# Patient Record
Sex: FEMALE | Race: BLACK OR AFRICAN AMERICAN | NOT HISPANIC OR LATINO | Employment: FULL TIME | URBAN - METROPOLITAN AREA
[De-identification: names, ages, dates, MRNs, and addresses within clinical notes are randomized per-mention and may not be internally consistent; named-entity substitution may affect disease eponyms.]

---

## 2019-01-22 ENCOUNTER — TELEPHONE (OUTPATIENT)
Dept: OBGYN CLINIC | Facility: CLINIC | Age: 39
End: 2019-01-22

## 2019-01-29 ENCOUNTER — TELEPHONE (OUTPATIENT)
Dept: OBGYN CLINIC | Facility: CLINIC | Age: 39
End: 2019-01-29

## 2019-01-29 NOTE — TELEPHONE ENCOUNTER
Patient calling in about her bp med and newly pregnant  PCP wants to switch meds  Currently taking toporal 50mg  Per Dr Sarah Guerrier - Let her know metoprolol is safe to take till we see her but ask her to record blood pressures daily at different times to have information to decide, meantime message [facility] and ask if they would recommend conversion to labetalol or if they want to see her to do it and discuss  Pt is aware and will reach out to [facility] as well

## 2019-02-04 PROBLEM — Z34.81 MULTIGRAVIDA IN FIRST TRIMESTER: Status: ACTIVE | Noted: 2019-02-04

## 2019-02-05 ENCOUNTER — HOSPITAL ENCOUNTER (OUTPATIENT)
Dept: ULTRASOUND IMAGING | Facility: HOSPITAL | Age: 39
Discharge: HOME | End: 2019-02-05
Payer: COMMERCIAL

## 2019-02-05 ENCOUNTER — INITIAL PRENATAL (OUTPATIENT)
Dept: OBGYN CLINIC | Facility: CLINIC | Age: 39
End: 2019-02-05
Payer: COMMERCIAL

## 2019-02-05 VITALS
HEIGHT: 64 IN | SYSTOLIC BLOOD PRESSURE: 128 MMHG | BODY MASS INDEX: 27.83 KG/M2 | WEIGHT: 163 LBS | DIASTOLIC BLOOD PRESSURE: 80 MMHG

## 2019-02-05 DIAGNOSIS — Z34.81 MULTIGRAVIDA IN FIRST TRIMESTER: Primary | ICD-10-CM

## 2019-02-05 DIAGNOSIS — Z34.81 MULTIGRAVIDA IN FIRST TRIMESTER: ICD-10-CM

## 2019-02-05 PROCEDURE — 99213 OFFICE O/P EST LOW 20 MIN: CPT | Performed by: PHYSICIAN ASSISTANT

## 2019-02-05 PROCEDURE — 76801 OB US < 14 WKS SINGLE FETUS: CPT

## 2019-02-05 RX ORDER — METOPROLOL TARTRATE 100 MG/1
50 TABLET ORAL
COMMUNITY
End: 2019-04-17

## 2019-02-05 RX ORDER — OMEPRAZOLE 20 MG/1
20 CAPSULE, DELAYED RELEASE ORAL
COMMUNITY
Start: 2018-03-12 | End: 2021-01-07

## 2019-02-05 NOTE — PROGRESS NOTES
Pt for NOB, pap and cx's done  AMA, unplanned pregnancy but pt has support from the FOB  She is nervous as she was much younger when she had her last pregnancies  She now has fibroids and HTN   TVUS + GS noted measuring c/w 6w2d no YS noted no CRL seen but difficult scan due to multiple fibroids in the uterus     Will plan [facility] consult

## 2019-02-06 ENCOUNTER — TELEPHONE (OUTPATIENT)
Dept: OBGYN CLINIC | Facility: CLINIC | Age: 39
End: 2019-02-06

## 2019-02-12 LAB
C TRACH RRNA SPEC QL NAA+PROBE: NOT DETECTED
CLINICAL INFO: NORMAL
CYTO CVX: NORMAL
CYTOLOGY CMNT CVX/VAG CYTO-IMP: NORMAL
DATE PREVIOUS BX: NORMAL
HPV E6+E7 MRNA CVX QL NAA+PROBE: NOT DETECTED
LMP START DATE: NORMAL
N GONORRHOEA RRNA SPEC QL NAA+PROBE: NOT DETECTED
QUESTION/PROBLEM: NORMAL
SL AMB CONTAINER TYPE: NORMAL
SL AMB FINAL RESOLUTION: NORMAL
SL AMB PREV. PAP:: NORMAL
SL AMB REPORT STATUS: NORMAL
SPECIMEN SOURCE CVX/VAG CYTO: NORMAL
T VAGINALIS RRNA SPEC QL NAA+PROBE: NOT DETECTED

## 2019-02-13 ENCOUNTER — TELEPHONE (OUTPATIENT)
Dept: OBGYN CLINIC | Facility: CLINIC | Age: 39
End: 2019-02-13

## 2019-02-15 ENCOUNTER — TELEPHONE (OUTPATIENT)
Dept: OBGYN CLINIC | Facility: CLINIC | Age: 39
End: 2019-02-15

## 2019-02-18 LAB
ABO GROUP BLD: NORMAL
B-HCG SERPL-ACNC: ABNORMAL MIU/ML
BASOPHILS # BLD AUTO: 32 CELLS/UL (ref 0–200)
BASOPHILS NFR BLD AUTO: 0.8 %
BLD GP AB SCN SERPL QL: NORMAL
EOSINOPHIL # BLD AUTO: 120 CELLS/UL (ref 15–500)
EOSINOPHIL NFR BLD AUTO: 3 %
ERYTHROCYTE [DISTWIDTH] IN BLOOD BY AUTOMATED COUNT: 15.3 % (ref 11–15)
HCT VFR BLD AUTO: 29.8 % (ref 35–45)
HGB BLD-MCNC: 9.3 G/DL (ref 11.7–15.5)
LYMPHOCYTES # BLD AUTO: 1444 CELLS/UL (ref 850–3900)
LYMPHOCYTES NFR BLD AUTO: 36.1 %
MCH RBC QN AUTO: 27.3 PG (ref 27–33)
MCHC RBC AUTO-ENTMCNC: 31.2 G/DL (ref 32–36)
MCV RBC AUTO: 87.4 FL (ref 80–100)
MONOCYTES # BLD AUTO: 472 CELLS/UL (ref 200–950)
MONOCYTES NFR BLD AUTO: 11.8 %
NEUTROPHILS # BLD AUTO: 1932 CELLS/UL (ref 1500–7800)
NEUTROPHILS NFR BLD AUTO: 48.3 %
PLATELET # BLD AUTO: 326 THOUSAND/UL (ref 140–400)
PMV BLD REES-ECKER: 9.9 FL (ref 7.5–12.5)
RBC # BLD AUTO: 3.41 MILLION/UL (ref 3.8–5.1)
RH BLD: NORMAL
WBC # BLD AUTO: 4 THOUSAND/UL (ref 3.8–10.8)

## 2019-02-19 ENCOUNTER — TELEPHONE (OUTPATIENT)
Dept: OBGYN CLINIC | Facility: CLINIC | Age: 39
End: 2019-02-19

## 2019-02-19 DIAGNOSIS — D64.9 ANEMIA, UNSPECIFIED TYPE: Primary | ICD-10-CM

## 2019-02-19 DIAGNOSIS — O20.9 VAGINAL BLEEDING AFFECTING EARLY PREGNANCY: ICD-10-CM

## 2019-02-19 DIAGNOSIS — Z3A.10 10 WEEKS GESTATION OF PREGNANCY: Primary | ICD-10-CM

## 2019-02-20 ENCOUNTER — TRANSCRIBE ORDERS (OUTPATIENT)
Dept: ADMINISTRATIVE | Age: 39
End: 2019-02-20

## 2019-02-20 ENCOUNTER — APPOINTMENT (OUTPATIENT)
Dept: LAB | Age: 39
End: 2019-02-20
Payer: COMMERCIAL

## 2019-02-20 ENCOUNTER — HOSPITAL ENCOUNTER (OUTPATIENT)
Dept: RADIOLOGY | Age: 39
Discharge: HOME | End: 2019-02-20
Payer: COMMERCIAL

## 2019-02-20 DIAGNOSIS — D64.9 ANEMIA, UNSPECIFIED TYPE: Primary | ICD-10-CM

## 2019-02-20 DIAGNOSIS — Z3A.10 10 WEEKS GESTATION OF PREGNANCY: ICD-10-CM

## 2019-02-20 DIAGNOSIS — O20.9 VAGINAL BLEEDING AFFECTING EARLY PREGNANCY: ICD-10-CM

## 2019-02-20 DIAGNOSIS — D64.9 ANEMIA, UNSPECIFIED TYPE: ICD-10-CM

## 2019-02-20 LAB
ALBUMIN SERPL BCP-MCNC: 3.4 G/DL (ref 3.5–5)
ALP SERPL-CCNC: 50 U/L (ref 46–116)
ALT SERPL W P-5'-P-CCNC: 13 U/L (ref 12–78)
ANION GAP SERPL CALCULATED.3IONS-SCNC: 7 MMOL/L (ref 4–13)
AST SERPL W P-5'-P-CCNC: 11 U/L (ref 5–45)
B-HCG SERPL-ACNC: 8943 MIU/ML
BASOPHILS # BLD AUTO: 0.03 THOUSANDS/ΜL (ref 0–0.1)
BASOPHILS NFR BLD AUTO: 1 % (ref 0–1)
BILIRUB SERPL-MCNC: 0.32 MG/DL (ref 0.2–1)
BUN SERPL-MCNC: 5 MG/DL (ref 5–25)
CALCIUM SERPL-MCNC: 8.6 MG/DL (ref 8.3–10.1)
CHLORIDE SERPL-SCNC: 106 MMOL/L (ref 100–108)
CO2 SERPL-SCNC: 26 MMOL/L (ref 21–32)
CREAT SERPL-MCNC: 0.74 MG/DL (ref 0.6–1.3)
EOSINOPHIL # BLD AUTO: 0.06 THOUSAND/ΜL (ref 0–0.61)
EOSINOPHIL NFR BLD AUTO: 2 % (ref 0–6)
ERYTHROCYTE [DISTWIDTH] IN BLOOD BY AUTOMATED COUNT: 16.7 % (ref 11.6–15.1)
FERRITIN SERPL-MCNC: 6 NG/ML (ref 8–388)
GFR SERPL CREATININE-BSD FRML MDRD: 120 ML/MIN/1.73SQ M
GLUCOSE SERPL-MCNC: 83 MG/DL (ref 65–140)
HCT VFR BLD AUTO: 33 % (ref 34.8–46.1)
HGB BLD-MCNC: 10 G/DL (ref 11.5–15.4)
IMM GRANULOCYTES # BLD AUTO: 0.01 THOUSAND/UL (ref 0–0.2)
IMM GRANULOCYTES NFR BLD AUTO: 0 % (ref 0–2)
IRON SERPL-MCNC: 75 UG/DL (ref 50–170)
LYMPHOCYTES # BLD AUTO: 1.23 THOUSANDS/ΜL (ref 0.6–4.47)
LYMPHOCYTES NFR BLD AUTO: 33 % (ref 14–44)
MCH RBC QN AUTO: 27.3 PG (ref 26.8–34.3)
MCHC RBC AUTO-ENTMCNC: 30.3 G/DL (ref 31.4–37.4)
MCV RBC AUTO: 90 FL (ref 82–98)
MONOCYTES # BLD AUTO: 0.31 THOUSAND/ΜL (ref 0.17–1.22)
MONOCYTES NFR BLD AUTO: 8 % (ref 4–12)
NEUTROPHILS # BLD AUTO: 2.1 THOUSANDS/ΜL (ref 1.85–7.62)
NEUTS SEG NFR BLD AUTO: 56 % (ref 43–75)
NRBC BLD AUTO-RTO: 0 /100 WBCS
PLATELET # BLD AUTO: 346 THOUSANDS/UL (ref 149–390)
PMV BLD AUTO: 10 FL (ref 8.9–12.7)
POTASSIUM SERPL-SCNC: 3.6 MMOL/L (ref 3.5–5.3)
PROT SERPL-MCNC: 7.8 G/DL (ref 6.4–8.2)
RBC # BLD AUTO: 3.66 MILLION/UL (ref 3.81–5.12)
SODIUM SERPL-SCNC: 139 MMOL/L (ref 136–145)
TIBC SERPL-MCNC: 418 UG/DL (ref 250–450)
WBC # BLD AUTO: 3.74 THOUSAND/UL (ref 4.31–10.16)

## 2019-02-20 PROCEDURE — 84702 CHORIONIC GONADOTROPIN TEST: CPT

## 2019-02-20 PROCEDURE — 85025 COMPLETE CBC W/AUTO DIFF WBC: CPT

## 2019-02-20 PROCEDURE — 82728 ASSAY OF FERRITIN: CPT

## 2019-02-20 PROCEDURE — 36415 COLL VENOUS BLD VENIPUNCTURE: CPT

## 2019-02-20 PROCEDURE — 76815 OB US LIMITED FETUS(S): CPT

## 2019-02-20 PROCEDURE — 83540 ASSAY OF IRON: CPT

## 2019-02-20 PROCEDURE — 83020 HEMOGLOBIN ELECTROPHORESIS: CPT

## 2019-02-20 PROCEDURE — 80053 COMPREHEN METABOLIC PANEL: CPT

## 2019-02-20 PROCEDURE — 83550 IRON BINDING TEST: CPT

## 2019-02-21 ENCOUNTER — TELEPHONE (OUTPATIENT)
Dept: OBGYN CLINIC | Facility: CLINIC | Age: 39
End: 2019-02-21

## 2019-02-21 DIAGNOSIS — O03.9 SPONTANEOUS MISCARRIAGE: Primary | ICD-10-CM

## 2019-02-21 NOTE — TELEPHONE ENCOUNTER
Pt called in bleeding  Reviewed with patient hcg level consistent with a loss  Patient will repeat labs 1-2 weeks depending on how bleeding progresses  All questions answered for patient and will call back if has any additional questions or concerns to call back  Otherwise to call once bw completed

## 2019-02-22 ENCOUNTER — APPOINTMENT (EMERGENCY)
Dept: RADIOLOGY | Facility: HOSPITAL | Age: 39
End: 2019-02-22
Payer: COMMERCIAL

## 2019-02-22 ENCOUNTER — HOSPITAL ENCOUNTER (EMERGENCY)
Facility: HOSPITAL | Age: 39
Discharge: HOME | End: 2019-02-22
Attending: EMERGENCY MEDICINE | Admitting: EMERGENCY MEDICINE
Payer: COMMERCIAL

## 2019-02-22 VITALS
DIASTOLIC BLOOD PRESSURE: 74 MMHG | TEMPERATURE: 97.5 F | BODY MASS INDEX: 27.66 KG/M2 | HEART RATE: 60 BPM | SYSTOLIC BLOOD PRESSURE: 140 MMHG | WEIGHT: 162 LBS | HEIGHT: 64 IN | OXYGEN SATURATION: 100 % | RESPIRATION RATE: 18 BRPM

## 2019-02-22 DIAGNOSIS — O03.9 SPONTANEOUS MISCARRIAGE: Primary | ICD-10-CM

## 2019-02-22 LAB
ABO GROUP BLD: NORMAL
ALBUMIN SERPL BCP-MCNC: 3.4 G/DL (ref 3.5–5)
ALP SERPL-CCNC: 52 U/L (ref 46–116)
ALT SERPL W P-5'-P-CCNC: 11 U/L (ref 12–78)
ANION GAP SERPL CALCULATED.3IONS-SCNC: 6 MMOL/L (ref 4–13)
AST SERPL W P-5'-P-CCNC: 18 U/L (ref 5–45)
B-HCG SERPL-ACNC: 5549 MIU/ML
BACTERIA UR QL AUTO: ABNORMAL /HPF
BASOPHILS # BLD AUTO: 0.04 THOUSANDS/ΜL (ref 0–0.1)
BASOPHILS NFR BLD AUTO: 1 % (ref 0–1)
BILIRUB SERPL-MCNC: 0.21 MG/DL (ref 0.2–1)
BILIRUB UR QL STRIP: NEGATIVE
BLD GP AB SCN SERPL QL: NEGATIVE
BUN SERPL-MCNC: 6 MG/DL (ref 5–25)
CALCIUM SERPL-MCNC: 8.5 MG/DL (ref 8.3–10.1)
CHLORIDE SERPL-SCNC: 108 MMOL/L (ref 100–108)
CLARITY UR: CLEAR
CO2 SERPL-SCNC: 27 MMOL/L (ref 21–32)
COLOR UR: YELLOW
COLOR, POC: YELLOW
CREAT SERPL-MCNC: 0.8 MG/DL (ref 0.6–1.3)
DEPRECATED HGB OTHER BLD-IMP: 0 %
EOSINOPHIL # BLD AUTO: 0.1 THOUSAND/ΜL (ref 0–0.61)
EOSINOPHIL NFR BLD AUTO: 2 % (ref 0–6)
ERYTHROCYTE [DISTWIDTH] IN BLOOD BY AUTOMATED COUNT: 17 % (ref 11.6–15.1)
GFR SERPL CREATININE-BSD FRML MDRD: 109 ML/MIN/1.73SQ M
GLUCOSE SERPL-MCNC: 80 MG/DL (ref 65–140)
GLUCOSE UR STRIP-MCNC: NEGATIVE MG/DL
HCT VFR BLD AUTO: 33.7 % (ref 34.8–46.1)
HGB A MFR BLD: 2.6 % (ref 1.8–3.2)
HGB A MFR BLD: 97.4 % (ref 96.4–98.8)
HGB BLD-MCNC: 10.1 G/DL (ref 11.5–15.4)
HGB C MFR BLD: 0 %
HGB F MFR BLD: 0 % (ref 0–2)
HGB FRACT BLD-IMP: NORMAL
HGB S BLD QL SOLY: NEGATIVE
HGB S MFR BLD: 0 %
HGB UR QL STRIP.AUTO: ABNORMAL
IMM GRANULOCYTES # BLD AUTO: 0.01 THOUSAND/UL (ref 0–0.2)
IMM GRANULOCYTES NFR BLD AUTO: 0 % (ref 0–2)
KETONES UR STRIP-MCNC: ABNORMAL MG/DL
LEUKOCYTE ESTERASE UR QL STRIP: NEGATIVE
LYMPHOCYTES # BLD AUTO: 1.34 THOUSANDS/ΜL (ref 0.6–4.47)
LYMPHOCYTES NFR BLD AUTO: 24 % (ref 14–44)
MCH RBC QN AUTO: 26.8 PG (ref 26.8–34.3)
MCHC RBC AUTO-ENTMCNC: 30 G/DL (ref 31.4–37.4)
MCV RBC AUTO: 89 FL (ref 82–98)
MONOCYTES # BLD AUTO: 0.42 THOUSAND/ΜL (ref 0.17–1.22)
MONOCYTES NFR BLD AUTO: 8 % (ref 4–12)
NEUTROPHILS # BLD AUTO: 3.68 THOUSANDS/ΜL (ref 1.85–7.62)
NEUTS SEG NFR BLD AUTO: 65 % (ref 43–75)
NITRITE UR QL STRIP: NEGATIVE
NON-SQ EPI CELLS URNS QL MICRO: ABNORMAL /HPF
NRBC BLD AUTO-RTO: 0 /100 WBCS
PH UR STRIP.AUTO: 8.5 [PH] (ref 4.5–8)
PLATELET # BLD AUTO: 355 THOUSANDS/UL (ref 149–390)
PMV BLD AUTO: 9.6 FL (ref 8.9–12.7)
POTASSIUM SERPL-SCNC: 3.8 MMOL/L (ref 3.5–5.3)
PROT SERPL-MCNC: 7.9 G/DL (ref 6.4–8.2)
PROT UR STRIP-MCNC: ABNORMAL MG/DL
RBC # BLD AUTO: 3.77 MILLION/UL (ref 3.81–5.12)
RBC #/AREA URNS AUTO: ABNORMAL /HPF
RH BLD: POSITIVE
SODIUM SERPL-SCNC: 141 MMOL/L (ref 136–145)
SP GR UR STRIP.AUTO: 1.02 (ref 1–1.03)
SPECIMEN EXPIRATION DATE: NORMAL
UROBILINOGEN UR QL STRIP.AUTO: 0.2 E.U./DL
WBC # BLD AUTO: 5.59 THOUSAND/UL (ref 4.31–10.16)
WBC #/AREA URNS AUTO: ABNORMAL /HPF

## 2019-02-22 PROCEDURE — 96374 THER/PROPH/DIAG INJ IV PUSH: CPT

## 2019-02-22 PROCEDURE — 81001 URINALYSIS AUTO W/SCOPE: CPT

## 2019-02-22 PROCEDURE — 36415 COLL VENOUS BLD VENIPUNCTURE: CPT | Performed by: EMERGENCY MEDICINE

## 2019-02-22 PROCEDURE — 76817 TRANSVAGINAL US OBSTETRIC: CPT

## 2019-02-22 PROCEDURE — 76816 OB US FOLLOW-UP PER FETUS: CPT

## 2019-02-22 PROCEDURE — 81003 URINALYSIS AUTO W/O SCOPE: CPT

## 2019-02-22 PROCEDURE — 84702 CHORIONIC GONADOTROPIN TEST: CPT | Performed by: EMERGENCY MEDICINE

## 2019-02-22 PROCEDURE — 86850 RBC ANTIBODY SCREEN: CPT

## 2019-02-22 PROCEDURE — 87086 URINE CULTURE/COLONY COUNT: CPT

## 2019-02-22 PROCEDURE — 85025 COMPLETE CBC W/AUTO DIFF WBC: CPT | Performed by: EMERGENCY MEDICINE

## 2019-02-22 PROCEDURE — 86900 BLOOD TYPING SEROLOGIC ABO: CPT

## 2019-02-22 PROCEDURE — 80053 COMPREHEN METABOLIC PANEL: CPT | Performed by: EMERGENCY MEDICINE

## 2019-02-22 PROCEDURE — 99284 EMERGENCY DEPT VISIT MOD MDM: CPT

## 2019-02-22 PROCEDURE — 86901 BLOOD TYPING SEROLOGIC RH(D): CPT

## 2019-02-22 RX ORDER — NAPROXEN 500 MG/1
500 TABLET ORAL 2 TIMES DAILY WITH MEALS
Qty: 30 TABLET | Refills: 0 | Status: SHIPPED | OUTPATIENT
Start: 2019-02-22 | End: 2021-01-07

## 2019-02-22 RX ORDER — KETOROLAC TROMETHAMINE 30 MG/ML
15 INJECTION, SOLUTION INTRAMUSCULAR; INTRAVENOUS ONCE
Status: COMPLETED | OUTPATIENT
Start: 2019-02-22 | End: 2019-02-22

## 2019-02-22 RX ADMIN — KETOROLAC TROMETHAMINE 15 MG: 30 INJECTION, SOLUTION INTRAMUSCULAR at 17:09

## 2019-02-22 NOTE — ED ATTENDING ATTESTATION
I, Stef Rouse DO, saw and evaluated the patient  I have discussed the patient with the resident/non-physician practitioner and agree with the resident's/non-physician practitioner's findings, Plan of Care, and MDM as documented in the resident's/non-physician practitioner's note, except where noted  All available labs and Radiology studies were reviewed  At this point I agree with the current assessment done in the Emergency Department  I have conducted an independent evaluation of this patient a history and physical is as follows:      Critical Care Time  Procedures     41 yr old fem to the ED with vaginal bleeding and cramping since yesterday  Had [address] with IUP  Hx of fibroids  Passsing clots wo tissue noted  US on 2/20 with smaller gestational sac, irregular and no gestational pole  Exm; BS US unable to identify gestational sac    Pln: quant, US, CBC

## 2019-02-22 NOTE — ED NOTES
Pt at ultrasound  Type and screen needs to be collected when pt returns       Doc Pacheco RN  02/22/19 7293

## 2019-02-23 NOTE — ED PROVIDER NOTES
History  Chief Complaint   Patient presents with    Threatened Miscarriage     pt c/o vaginal bleeding and abdominal cramping, 7 weeks pregnant      27-year-old G5 P 3013 at estimated 10 weeks gestational age presenting to the emergency department for evaluation pelvic cramping  The patient was seen earlier by her OBGYN until that she is likely having a miscarriage  She had  Had a previous ultrasound did demonstrate an IUP but had a follow-up appointment which demonstrated a lower hCG  She started with vaginal spotting yesterday and has now had more heavy flow today,   Comparable to a period  Her OBGYN recommended that she come to the emergency department for evaluation  The patient wants to know if there is still a fetus or this past   She has been taking Tylenol for the pain which is been   Only minimally controlling her symptoms  She denies vaginal discharge, fevers or chills  She denies nausea or vomiting  Prior to Admission Medications   Prescriptions Last Dose Informant Patient Reported? Taking? Prenatal MV-Min-Fe Fum-FA-DHA (PRENATAL 1 PO)   Yes Yes   Sig: Take by mouth   metoprolol tartrate (LOPRESSOR) 100 mg tablet   Yes Yes   Sig: Take 50 mg by mouth   omeprazole (PriLOSEC) 20 mg delayed release capsule   Yes Yes   Sig: Take 20 mg by mouth      Facility-Administered Medications: None       Past Medical History:   Diagnosis Date    Abnormal Pap smear of cervix         Depression     GERD (gastroesophageal reflux disease)     Hypertension        Past Surgical History:   Procedure Laterality Date     SECTION      CHOLECYSTECTOMY      GASTRIC RESTRICTION SURGERY         Family History   Problem Relation Age of Onset    Diabetes Mother     Hypertension Mother     Thyroid disease Mother     Heart disease Father     Diabetes Sister      I have reviewed and agree with the history as documented      Social History     Tobacco Use    Smoking status: Never Smoker    Smokeless tobacco: Never Used   Substance Use Topics    Alcohol use: Not Currently    Drug use: Not Currently        Review of Systems   Constitutional: Negative for appetite change, chills, fatigue and fever  HENT: Negative for sneezing and sore throat  Eyes: Negative for visual disturbance  Respiratory: Negative for cough, choking, chest tightness, shortness of breath and wheezing  Cardiovascular: Negative for chest pain and palpitations  Gastrointestinal: Negative for abdominal pain, constipation, diarrhea, nausea and vomiting  Genitourinary: Positive for pelvic pain  Negative for difficulty urinating and dysuria  Neurological: Negative for dizziness, weakness, light-headedness, numbness and headaches  All other systems reviewed and are negative  Physical Exam  ED Triage Vitals [02/22/19 1443]   Temperature Pulse Respirations Blood Pressure SpO2   97 5 °F (36 4 °C) 72 18 118/80 98 %      Temp Source Heart Rate Source Patient Position - Orthostatic VS BP Location FiO2 (%)   Oral Monitor Sitting Left arm --      Pain Score       8           Orthostatic Vital Signs  Vitals:    02/22/19 1443 02/22/19 1645 02/22/19 1803   BP: 118/80 142/79 140/74   Pulse: 72 66 60   Patient Position - Orthostatic VS: Sitting Lying Sitting       Physical Exam   Constitutional: She is oriented to person, place, and time  She appears well-developed and well-nourished  No distress  HENT:   Head: Normocephalic and atraumatic  Mouth/Throat: Oropharynx is clear and moist    Eyes: Pupils are equal, round, and reactive to light  EOM are normal    Neck: No JVD present  No tracheal deviation present  Cardiovascular: Normal rate, regular rhythm, normal heart sounds and intact distal pulses  Exam reveals no gallop and no friction rub  No murmur heard  Pulmonary/Chest: Effort normal and breath sounds normal  No respiratory distress  She has no wheezes  She has no rales  Abdominal: Soft   Bowel sounds are normal  She exhibits no distension  There is tenderness in the suprapubic area  There is no rebound and no guarding  Neurological: She is alert and oriented to person, place, and time  No cranial nerve deficit  She exhibits normal muscle tone  Skin: Skin is warm and dry  She is not diaphoretic  No pallor  Psychiatric: She has a normal mood and affect  Her behavior is normal    Nursing note and vitals reviewed        ED Medications  Medications   ketorolac (TORADOL) injection 15 mg (15 mg Intravenous Given 2/22/19 1709)       Diagnostic Studies  Results Reviewed     Procedure Component Value Units Date/Time    Urine Microscopic [[id number]]  (Abnormal) Collected:  02/22/19 1548    Lab Status:  Final result Specimen:  Urine, Clean Catch Updated:  02/22/19 1824     RBC, UA 10-20 /hpf      WBC, UA None Seen /hpf      Epithelial Cells None Seen /hpf      Bacteria, UA Occasional /hpf     hCG, quantitative [[id number]]  (Abnormal) Collected:  02/22/19 1540    Lab Status:  Final result Specimen:  Blood from Arm, Right Updated:  02/22/19 1629     HCG, Quant 5,549 mIU/mL     Narrative:        Expected Ranges:   Approximate               Approximate HCG  Gestation age          Concentration ( mIU/mL)  _____________          ______________________   Ileene Barter                      HCG values  0 2-1                       5-50  1-2                           2-3                         100-5000  3-4                         500-11320  4-5                         1000-05348  5-6                         [id number]  6-8                         [id number]  8-12                        [id number]    Comprehensive metabolic panel [[id number]]  (Abnormal) Collected:  02/22/19 1540    Lab Status:  Final result Specimen:  Blood from Arm, Right Updated:  02/22/19 1616     Sodium 141 mmol/L      Potassium 3 8 mmol/L      Chloride 108 mmol/L      CO2 27 mmol/L      ANION GAP 6 mmol/L      BUN 6 mg/dL      Creatinine 0 80 mg/dL      Glucose 80 mg/dL      Calcium 8 5 mg/dL      AST 18 U/L      ALT 11 U/L      Alkaline Phosphatase 52 U/L      Total Protein 7 9 g/dL      Albumin 3 4 g/dL      Total Bilirubin 0 21 mg/dL      eGFR 109 ml/min/1 73sq m     Narrative:       National Kidney Disease Education Program recommendations are as follows:  GFR calculation is accurate only with a steady state creatinine  Chronic Kidney disease less than 60 ml/min/1 73 sq  meters  Kidney failure less than 15 ml/min/1 73 sq  meters  CBC and differential [[id number]]  (Abnormal) Collected:  02/22/19 1540    Lab Status:  Final result Specimen:  Blood from Arm, Right Updated:  02/22/19 1608     WBC 5 59 Thousand/uL      RBC 3 77 Million/uL      Hemoglobin 10 1 g/dL      Hematocrit 33 7 %      MCV 89 fL      MCH 26 8 pg      MCHC 30 0 g/dL      RDW 17 0 %      MPV 9 6 fL      Platelets 284 Thousands/uL      nRBC 0 /100 WBCs      Neutrophils Relative 65 %      Immat GRANS % 0 %      Lymphocytes Relative 24 %      Monocytes Relative 8 %      Eosinophils Relative 2 %      Basophils Relative 1 %      Neutrophils Absolute 3 68 Thousands/µL      Immature Grans Absolute 0 01 Thousand/uL      Lymphocytes Absolute 1 34 Thousands/µL      Monocytes Absolute 0 42 Thousand/µL      Eosinophils Absolute 0 10 Thousand/µL      Basophils Absolute 0 04 Thousands/µL     POCT urinalysis dipstick [[id number]]  (Normal) Resulted:  02/22/19 1546    Lab Status:  Final result Updated:  02/22/19 1553     Color, UA Yellow    Urine culture [[id number]] Collected:  02/22/19 1548    Lab Status:   In process Specimen:  Urine, Clean Catch Updated:  02/22/19 1549    ED Urine Macroscopic [[id number]]  (Abnormal) Collected:  02/22/19 1548    Lab Status:  Final result Specimen:  Urine Updated:  02/22/19 1546     Color, UA Yellow     Clarity, UA Clear     pH, UA 8 5     Leukocytes, UA Negative     Nitrite, UA Negative     Protein, UA 30 (1+) mg/dl      Glucose, UA Negative mg/dl      Ketones, UA Trace mg/dl Urobilinogen, UA 0 2 E U /dl      Bilirubin, UA Negative     Blood, UA Large     Specific [place], UA 1 020    Narrative:       [address] OB follow up transabdominal approach   Final Result by Tae Lutz MD ([id number])      Heterogeneous 15 mm vascular endometrium containing endometrial material which could represent blood products or retained products of conception  OB consult and follow-up pelvic/OB ultrasound recommended in 1-2 weeks as I cannot exclude retained products    of conception  Enlarged myomatous uterus  Workstation performed: [id number]               Procedures  Procedures      Phone Consults  ED Phone Contact    ED Course                               MDM  Number of Diagnoses or Management Options  Spontaneous miscarriage:   Diagnosis management comments:   81-year-old female with likely spontaneous first-trimester   Will obtain ultrasound to look for the evidence of endometritis versus retained products of conception control symptoms  The patient feels better and has no evidence of infection she may be discharge with OBGYN follow-up  Otherwise will discuss OBGYN here for D and C      Disposition  Final diagnoses:   Spontaneous miscarriage     Time reflects when diagnosis was documented in both MDM as applicable and the Disposition within this note     Time User Action Codes Description Comment    2019  5:55 PM Magda Crooks Add [O03 9] Spontaneous miscarriage       ED Disposition     ED Disposition Condition Date/Time Comment    Discharge Stable   5:55 PM Yvonne Bahena discharge to home/self care              Follow-up Information    None         Discharge Medication List as of 2019  5:57 PM      START taking these medications    Details   naproxen (NAPROSYN) 500 mg tablet Take 1 tablet (500 mg total) by mouth 2 (two) times a day with meals, Starting 2019, Print         CONTINUE these medications which have NOT CHANGED    Details   metoprolol tartrate (LOPRESSOR) 100 mg tablet Take 50 mg by mouth, Historical Med      omeprazole (PriLOSEC) 20 mg delayed release capsule Take 20 mg by mouth, Starting Mon 3/12/2018, Historical Med      Prenatal MV-Min-Fe Fum-FA-DHA (PRENATAL 1 PO) Take by mouth, Historical Med           No discharge procedures on file  ED Provider  Attending physically available and evaluated Crista Hammans I managed the patient along with the ED Attending      Electronically Signed by         Mertha Cushing, MD  02/23/19 4074

## 2019-02-25 LAB — BACTERIA UR CULT: NORMAL

## 2019-03-08 ENCOUNTER — TELEPHONE (OUTPATIENT)
Dept: OBGYN CLINIC | Facility: CLINIC | Age: 39
End: 2019-03-08

## 2019-03-14 ENCOUNTER — TELEPHONE (OUTPATIENT)
Dept: OBGYN CLINIC | Facility: CLINIC | Age: 39
End: 2019-03-14

## 2019-03-28 ENCOUNTER — TELEPHONE (OUTPATIENT)
Dept: OBGYN CLINIC | Facility: CLINIC | Age: 39
End: 2019-03-28

## 2019-03-28 LAB — B-HCG SERPL-ACNC: <2 MIU/ML

## 2019-04-17 ENCOUNTER — OFFICE VISIT (OUTPATIENT)
Dept: OBGYN CLINIC | Facility: CLINIC | Age: 39
End: 2019-04-17
Payer: COMMERCIAL

## 2019-04-17 VITALS
DIASTOLIC BLOOD PRESSURE: 88 MMHG | SYSTOLIC BLOOD PRESSURE: 134 MMHG | WEIGHT: 169 LBS | HEIGHT: 64 IN | BODY MASS INDEX: 28.85 KG/M2

## 2019-04-17 DIAGNOSIS — D25.2 INTRAMURAL AND SUBSEROUS LEIOMYOMA OF UTERUS: Primary | ICD-10-CM

## 2019-04-17 DIAGNOSIS — D25.1 INTRAMURAL AND SUBSEROUS LEIOMYOMA OF UTERUS: Primary | ICD-10-CM

## 2019-04-17 PROBLEM — Z01.419 GYNECOLOGIC EXAM NORMAL: Status: ACTIVE | Noted: 2019-04-17

## 2019-04-17 PROBLEM — Z12.9 SPECIAL SCREENING FOR MALIGNANT NEOPLASM: Status: ACTIVE | Noted: 2019-03-13

## 2019-04-17 PROCEDURE — 99213 OFFICE O/P EST LOW 20 MIN: CPT | Performed by: PHYSICIAN ASSISTANT

## 2019-04-17 RX ORDER — METOPROLOL TARTRATE 50 MG/1
TABLET, FILM COATED ORAL
COMMUNITY
Start: 2019-02-16 | End: 2021-01-07

## 2019-08-21 ENCOUNTER — OFFICE VISIT (OUTPATIENT)
Dept: OBGYN CLINIC | Facility: CLINIC | Age: 39
End: 2019-08-21
Payer: COMMERCIAL

## 2019-08-21 VITALS
WEIGHT: 164 LBS | SYSTOLIC BLOOD PRESSURE: 120 MMHG | BODY MASS INDEX: 28 KG/M2 | HEIGHT: 64 IN | DIASTOLIC BLOOD PRESSURE: 82 MMHG

## 2019-08-21 DIAGNOSIS — N92.0 MENORRHAGIA WITH REGULAR CYCLE: Primary | ICD-10-CM

## 2019-08-21 PROBLEM — Z34.81 MULTIGRAVIDA IN FIRST TRIMESTER: Status: RESOLVED | Noted: 2019-02-04 | Resolved: 2019-08-21

## 2019-08-21 PROCEDURE — 99213 OFFICE O/P EST LOW 20 MIN: CPT | Performed by: PHYSICIAN ASSISTANT

## 2019-08-21 RX ORDER — ERGOCALCIFEROL (VITAMIN D2) 1250 MCG
CAPSULE ORAL
COMMUNITY
Start: 2019-08-15 | End: 2021-01-07 | Stop reason: ALTCHOICE

## 2019-08-21 RX ORDER — FERROUS SULFATE 325(65) MG
325 TABLET ORAL
COMMUNITY

## 2019-08-21 NOTE — ASSESSMENT & PLAN NOTE
Trial of OE patch for birth control and to gain control of heavy periods; RTO in 3 months for patch and BP check

## 2019-08-21 NOTE — PROGRESS NOTES
Assessment/Plan   Diagnoses and all orders for this visit:    Menorrhagia with regular cycle  -     norelgestromin-ethinyl estradiol (ORTHO EVRA) 150-35 MCG/24HR; Place 1 patch on the skin once a week    Discussion  Various contraceptive options were reviewed including, but not limited to, barrier methods (condoms, diaphragm), both combination (pill, patch, vaginal ring) and progesterone- only (pill, Depo provera, and Nexplanon), intrauterine devices (hermes, Mirena and Paragard)  The mechanisms, risks, benefits, and side effects of all methods were discussed  All questions have been answered to her satisfaction  Desires OE patch - if blood pressure elevates due to hormones or PCP strongly against combination hormonal therapy, will likely consider Nexplanon  We can also consider nuva ring as a 20 mcg option:   1)  Begin the patch with her next period and reviewed how to apply and schedule  2) It common to experience some irregular bleeding when newly starting the patch  This should resolve after 3 months of use  Also minor side effects such as breast tenderness, minor headaches and nausea may occur, but typically resolve after continuing on the pill for at least 3 months  3)  Call if you experience severe headaches, visual disturbances, chest pain, shortness of breath, abdominal pain, pain tingling or weakness in arms or legs  4) Advise a back-up method, like condoms, during the first month on the patch, if misses any patches, or is put on antibiotics  5) Advise safe sexual practices and the importance of condoms to prevent the transmission of STDs  6) Return visit in 3 months for patch & blood pressure check      I have spent 15 minutes with Patient  today in which greater than 50% of this time was spent in counseling/coordination of care regarding Risks and benefits of tx options, Intructions for management, Patient and family education, Importance of tx compliance, Risk factor reductions and Impressions  Subjective     HPI   Cayetano Canales is a 46 y o  female who presents for a birth control discussion  Patient had a pregnancy loss 2/2019 - Pelvic ultrasound during pregnancy revealed fibroid uterus; subserosal right uterine fundal fibroid measuring 7 3cm, intramural fibroid in posterior mid uterus measuring 5 2cm, and subserosal left uterine fundal fibroid measuring 3 5cm  Patient past history also significant for iron deficiency anemia, HTN, and gastric sleeve  Her HTN is currently under control with metoprolol tarte  Patient has been treating iron def  Anemia with subdermal patch and seems to be working well for her  She desires to trial OE patch for [facility]  Menarche - 11; LMP - 8/4/19; Periods are reg q month and last 5 days; Heavy flow the first 2 days - at the heaviest changes pad and ultra tampon every 2 hours; No intermenstrual bleeding or spotting; Cramps are tolerable with ibuprofen or tylenol  No abd/pelvic pain or HAs; History is negative for liver, gallbladder or thromboembolic disease or migraine headaches with aura    Pt is sexually active in a mutually monog sexual relationship - same partner as last visit; No issues with intercourse; She declines std/hiv/hep testing; Feels safe at home  Current contraception: none    Last Pap - 2/5/19 - WNL (-) HRHPV type 16/18 negative; C/G negative  History of abnormal Pap smear: yes    Review of Systems   Constitutional: Negative for activity change, fatigue, fever and unexpected weight change  HENT: Negative for congestion, dental problem, sinus pressure and sinus pain  Eyes: Negative for visual disturbance  Respiratory: Negative for cough, shortness of breath and wheezing  Cardiovascular: Negative for chest pain and leg swelling  Gastrointestinal: Positive for constipation (varies with IBS)  Negative for abdominal distention, abdominal pain, blood in stool, diarrhea, nausea and vomiting  Endocrine: Negative for polydipsia  Genitourinary: Positive for menstrual problem (as noted in HPI)  Negative for difficulty urinating, dyspareunia, dysuria, frequency, hematuria, pelvic pain, urgency, vaginal bleeding, vaginal discharge and vaginal pain  Musculoskeletal: Negative for arthralgias and back pain  Allergic/Immunologic: Negative for environmental allergies  Neurological: Negative for dizziness, seizures and headaches  Psychiatric/Behavioral: Negative for dysphoric mood and sleep disturbance  The patient is not nervous/anxious          The following portions of the patient's history were reviewed and updated as appropriate: allergies, current medications, past family history, past medical history, past social history, past surgical history and problem list          OB History        5    Para   3    Term   3            AB   1    Living   3       SAB   1    TAB        Ectopic        Multiple        Live Births   3                 Past Medical History:   Diagnosis Date    Abnormal Pap smear of cervix         Depression     Fibroid     GERD (gastroesophageal reflux disease)     Hypertension        Past Surgical History:   Procedure Laterality Date     SECTION      CHOLECYSTECTOMY      GASTRIC RESTRICTION SURGERY         Family History   Problem Relation Age of Onset    Diabetes Mother     Hypertension Mother     Thyroid disease Mother     Heart disease Father     Diabetes Sister     Heart disease Paternal Grandmother        Social History     Socioeconomic History    Marital status: Single     Spouse name: Not on file    Number of children: Not on file    Years of education: Not on file    Highest education level: Not on file   Occupational History    Not on file   Social Needs    Financial resource strain: Not on file    Food insecurity:     Worry: Not on file     Inability: Not on file    Transportation needs:     Medical: Not on file     Non-medical: Not on file   Tobacco Use    Smoking status: Never Smoker    Smokeless tobacco: Never Used   Substance and Sexual Activity    Alcohol use: Not Currently    Drug use: Not Currently    Sexual activity: Yes     Partners: Male     Birth control/protection: None   Lifestyle    Physical activity:     Days per week: Not on file     Minutes per session: Not on file    Stress: Not on file   Relationships    Social connections:     Talks on phone: Not on file     Gets together: Not on file     Attends Bahai service: Not on file     Active member of club or organization: Not on file     Attends meetings of clubs or organizations: Not on file     Relationship status: Not on file    Intimate partner violence:     Fear of current or ex partner: Not on file     Emotionally abused: Not on file     Physically abused: Not on file     Forced sexual activity: Not on file   Other Topics Concern    Not on file   Social History Narrative    Not on file         Current Outpatient Medications:     metoprolol tartrate (LOPRESSOR) 50 mg tablet, , Disp: , Rfl:     Multiple Vitamins-Minerals (MULTIVITAMIN ADULT PO), Take 1 tablet by mouth, Disp: , Rfl:     B Complex Vitamins (B COMPLEX 1 PO), Take 1 tablet by mouth, Disp: , Rfl:     Calcium Carb-Cholecalciferol (CALCIUM 1000 + D) 1000-800 MG-UNIT TABS, Take 1 tablet by mouth, Disp: , Rfl:     naproxen (NAPROSYN) 500 mg tablet, Take 1 tablet (500 mg total) by mouth 2 (two) times a day with meals, Disp: 30 tablet, Rfl: 0    omeprazole (PriLOSEC) 20 mg delayed release capsule, Take 20 mg by mouth, Disp: , Rfl:     No Known Allergies    Objective   Vitals:    08/21/19 0813   BP: 120/82   BP Location: Left arm   Patient Position: Sitting   Cuff Size: Standard   Weight: 74 4 kg (164 lb)   Height: 5' 3 5" (1 613 m)     Physical Exam   Constitutional: She appears well-developed and well-nourished  No distress  Skin: She is not diaphoretic  Psychiatric: She has a normal mood and affect   Her behavior is normal    Vitals reviewed

## 2020-01-20 ENCOUNTER — TELEPHONE (OUTPATIENT)
Dept: OBGYN CLINIC | Facility: CLINIC | Age: 40
End: 2020-01-20

## 2020-01-20 DIAGNOSIS — N92.0 MENORRHAGIA WITH REGULAR CYCLE: ICD-10-CM

## 2020-01-20 NOTE — TELEPHONE ENCOUNTER
Patient canceled patch check apt in December, had cardiology apt in December  BP was 120/80  Ok to fill?

## 2020-01-22 ENCOUNTER — TELEPHONE (OUTPATIENT)
Dept: OBGYN CLINIC | Facility: CLINIC | Age: 40
End: 2020-01-22

## 2020-01-22 DIAGNOSIS — N92.0 MENORRHAGIA WITH REGULAR CYCLE: ICD-10-CM

## 2020-01-22 NOTE — TELEPHONE ENCOUNTER
rx sent to [facility] to sign for 90 day supply  Will go to pharmacy once [facility] signs   Patient due for annual in February

## 2020-01-23 DIAGNOSIS — N92.0 MENORRHAGIA WITH REGULAR CYCLE: ICD-10-CM

## 2020-01-23 NOTE — TELEPHONE ENCOUNTER
Patient called requesting script be sent to [facility] pharmacy in [place] off [address] (listed in patient's chart) patient is also requesting a 90 day supply be sent, states insurance will not cover it if it is not a 90 day supply  Please advise, thank you!

## 2020-03-05 ENCOUNTER — ANNUAL EXAM (OUTPATIENT)
Dept: OBGYN CLINIC | Facility: CLINIC | Age: 40
End: 2020-03-05
Payer: COMMERCIAL

## 2020-03-05 VITALS
DIASTOLIC BLOOD PRESSURE: 80 MMHG | HEIGHT: 64 IN | WEIGHT: 164 LBS | SYSTOLIC BLOOD PRESSURE: 122 MMHG | BODY MASS INDEX: 28 KG/M2

## 2020-03-05 DIAGNOSIS — D25.1 INTRAMURAL AND SUBSEROUS LEIOMYOMA OF UTERUS: ICD-10-CM

## 2020-03-05 DIAGNOSIS — N92.0 MENORRHAGIA WITH REGULAR CYCLE: ICD-10-CM

## 2020-03-05 DIAGNOSIS — Z01.419 ROUTINE GYNECOLOGICAL EXAMINATION: Primary | ICD-10-CM

## 2020-03-05 DIAGNOSIS — Z12.4 PAP SMEAR FOR CERVICAL CANCER SCREENING: ICD-10-CM

## 2020-03-05 DIAGNOSIS — D25.2 INTRAMURAL AND SUBSEROUS LEIOMYOMA OF UTERUS: ICD-10-CM

## 2020-03-05 PROBLEM — E87.6 HYPOKALEMIA: Status: ACTIVE | Noted: 2019-11-08

## 2020-03-05 PROCEDURE — 99395 PREV VISIT EST AGE 18-39: CPT | Performed by: PHYSICIAN ASSISTANT

## 2020-03-05 RX ORDER — CLOTRIMAZOLE AND BETAMETHASONE DIPROPIONATE 10; .64 MG/G; MG/G
CREAM TOPICAL 2 TIMES DAILY
COMMUNITY
Start: 2020-02-27

## 2020-03-06 NOTE — PROGRESS NOTES
Assessment/Plan   Problem List Items Addressed This Visit        Genitourinary    Uterine leiomyoma    Relevant Orders    US pelvis complete non OB       Other    Routine gynecological examination - Primary     Pap guidelines reviewed  Pap with HPV done today  Reviewed concerned about increased blood pressure and being on combined contraceptive  At this time blood pressure has been well controlled on her metoprolol so we will plan to continue the patch  Reviewed with patient if she notices that her blood pressure starting to rise we should consider progesterone only methods  Did review clotting risk with patient and aware of signs and symptoms to look out for  Patient is comfortable continuing the Ortho Evra patch at this time  Script for a pelvic ultrasound given to evaluate uterine fibroids  Had reviewed with the patient previously if she had planned to conceive any more in the future would need to go through Reproductive Medicine to discuss possible myomectomy  Return to office for annual or as needed  Other Visit Diagnoses     Pap smear for cervical cancer screening        Relevant Orders    Thinprep Pap and HPV mRNA E6/E7 (Completed)          Subjective:     Patient ID: Princess Chong is a 45 y o  y o  female  HPI  47 yo seen for annual exam    Patient reports menses are monthly but heavy and painful  Currently using Ortho Evra patch for birth control  Tolerates well would like to continue  Patient currently on metoprolol 100 mg for hypertension  Reports recently increased dosage secondary to high blood pressure  Blood pressure has been good on 100 mg dose  Last pelvic ultrasound on 2/5/19 showed enlarged uterus measuring 12 4 x 9 1 x 10 7 cm with multiple fibroids including a subserosal right uterine fundal fibroid measuring 7 3 cm, and intramural posterior mid uterine fibroid measuring 5 2 cm and a subserosal left uterine fundal fibroid measuring 3 5 cm    Last pap: 2/5/2019 NILM (-)HRHPV  The following portions of the patient's history were reviewed and updated as appropriate:   She  has a past medical history of Abnormal Pap smear of cervix, Depression, Fibroid, GERD (gastroesophageal reflux disease), Hypertension, and Iron deficiency anemia  She   Patient Active Problem List    Diagnosis Date Noted    Routine gynecological examination 2020    Hypokalemia 2019    Menorrhagia with regular cycle 2019    Gynecologic exam normal 2019    Special screening for malignant neoplasm 2019    Uterine leiomyoma 2016    Essential hypertension 2013     She  has a past surgical history that includes Cholecystectomy;  section; Gastric restriction surgery; Colposcopy (); and Bariatric Surgery (2018)  Her family history includes Diabetes in her mother and sister; Heart disease in her father, maternal grandmother, and paternal grandmother; Heart failure in her father and maternal grandmother; Hypertension in her brother, mother, sister, and sister; Thyroid disease in her mother  She  reports that she has never smoked  She has never used smokeless tobacco  She reports that she drank alcohol  She reports that she does not use drugs    Current Outpatient Medications   Medication Sig Dispense Refill    clotrimazole-betamethasone (LOTRISONE) 1-0 05 % cream 2 (two) times a day Apply sparingly to affected area      ergocalciferol (ERGOCALCIFEROL) 20293 units capsule       ferrous sulfate 325 (65 Fe) mg tablet Take 325 mg by mouth daily with breakfast      metoprolol tartrate (LOPRESSOR) 50 mg tablet       Multiple Vitamins-Minerals (MULTIVITAMIN ADULT PO) Take 1 tablet by mouth      naproxen (NAPROSYN) 500 mg tablet Take 1 tablet (500 mg total) by mouth 2 (two) times a day with meals 30 tablet 0    norelgestromin-ethinyl estradiol (ORTHO EVRA) 150-35 MCG/24HR Place 1 patch on the skin once a week 9 patch 0    omeprazole (PriLOSEC) 20 mg delayed release capsule Take 20 mg by mouth       No current facility-administered medications for this visit  She has No Known Allergies       Menstrual History:  OB History        5    Para   3    Term   3            AB   1    Living   3       SAB   1    TAB        Ectopic        Multiple        Live Births   3                Menarche age: 9  No LMP recorded  Period Cycle (Days): 28  Period Duration (Days): 6  Period Pattern: Regular  Menstrual Flow: Moderate, Heavy  Dysmenorrhea: (!) Moderate  Dysmenorrhea Symptoms: Cramping      Review of Systems   Constitutional: Negative for fatigue, fever and unexpected weight change  HENT: Negative for dental problem and sinus pressure  Eyes: Negative for visual disturbance  Respiratory: Negative for cough, shortness of breath and wheezing  Cardiovascular: Negative for chest pain  Gastrointestinal: Negative for abdominal pain, blood in stool, constipation, diarrhea, nausea and vomiting  Endocrine: Negative for polydipsia  Genitourinary: Positive for dyspareunia, menstrual problem and pelvic pain  Negative for difficulty urinating, dysuria, frequency, hematuria and urgency  Musculoskeletal: Negative for arthralgias and back pain  Neurological: Negative for dizziness, seizures, light-headedness and headaches  Psychiatric/Behavioral: Negative for suicidal ideas  The patient is not nervous/anxious  Objective:  Vitals:    20 1700   BP: 122/80   BP Location: Left arm   Patient Position: Sitting   Cuff Size: Standard   Weight: 74 4 kg (164 lb)   Height: 5' 3 5" (1 613 m)      Physical Exam   Constitutional: She is oriented to person, place, and time  She appears well-developed and well-nourished  Genitourinary: Vagina normal  There is no rash, tenderness, lesion, injury or Bartholin's cyst on the right labia  There is no rash, tenderness, lesion, injury or Bartholin's cyst on the left labia  Vagina exhibits no lesion   No erythema, tenderness or bleeding in the vagina  No signs of injury around the vagina  No vaginal discharge found  Right adnexum does not display mass, does not display tenderness and does not display fullness  Left adnexum does not display mass, does not display tenderness and does not display fullness  Cervix does not exhibit motion tenderness, lesion or discharge  Uterus is enlarged (15 week uterus)  Uterus is not tender, exhibiting a mass, irregular (is regular) or mobile  HENT:   Head: Normocephalic and atraumatic  Neck: No thyromegaly present  Cardiovascular: Normal rate, regular rhythm and normal heart sounds  Exam reveals no gallop and no friction rub  No murmur heard  Pulmonary/Chest: Effort normal and breath sounds normal  No respiratory distress  She has no wheezes  Right breast exhibits no inverted nipple, no mass, no nipple discharge, no skin change and no tenderness  Left breast exhibits no inverted nipple, no mass, no nipple discharge, no skin change and no tenderness  No breast swelling  Breasts are symmetrical    Abdominal: Soft  She exhibits no distension and no mass  There is no tenderness  There is no rebound and no guarding  No hernia  Lymphadenopathy:     She has no cervical adenopathy  Right: No inguinal adenopathy present  Left: No inguinal adenopathy present  Neurological: She is alert and oriented to person, place, and time  Skin: Skin is warm and dry  Psychiatric: She has a normal mood and affect   Her behavior is normal

## 2020-03-10 LAB
CLINICAL INFO: NORMAL
CYTO CVX: NORMAL
DATE PREVIOUS BX: NORMAL
HPV E6+E7 MRNA CVX QL NAA+PROBE: NOT DETECTED
LMP START DATE: NORMAL
SL AMB PREV. PAP:: NORMAL
SPECIMEN SOURCE CVX/VAG CYTO: NORMAL

## 2020-03-21 PROBLEM — Z01.419 ROUTINE GYNECOLOGICAL EXAMINATION: Status: ACTIVE | Noted: 2020-03-21

## 2020-03-21 NOTE — ASSESSMENT & PLAN NOTE
Pap guidelines reviewed  Pap with HPV done today  Reviewed concerned about increased blood pressure and being on combined contraceptive  At this time blood pressure has been well controlled on her metoprolol so we will plan to continue the patch  Reviewed with patient if she notices that her blood pressure starting to rise we should consider progesterone only methods  Did review clotting risk with patient and aware of signs and symptoms to look out for  Patient is comfortable continuing the Ortho Evra patch at this time  Script for a pelvic ultrasound given to evaluate uterine fibroids  Had reviewed with the patient previously if she had planned to conceive any more in the future would need to go through Reproductive Medicine to discuss possible myomectomy  Return to office for annual or as needed

## 2020-04-14 DIAGNOSIS — N92.0 MENORRHAGIA WITH REGULAR CYCLE: ICD-10-CM

## 2020-04-14 DIAGNOSIS — N92.1 MENORRHAGIA WITH IRREGULAR CYCLE: Primary | ICD-10-CM

## 2020-04-14 RX ORDER — ACETAMINOPHEN AND CODEINE PHOSPHATE 120; 12 MG/5ML; MG/5ML
1 SOLUTION ORAL DAILY
Qty: 84 TABLET | Refills: 3 | Status: SHIPPED | OUTPATIENT
Start: 2020-04-14 | End: 2021-02-25 | Stop reason: HOSPADM

## 2021-01-07 ENCOUNTER — OFFICE VISIT (OUTPATIENT)
Dept: OBGYN CLINIC | Facility: CLINIC | Age: 41
End: 2021-01-07
Payer: COMMERCIAL

## 2021-01-07 VITALS
WEIGHT: 170.6 LBS | BODY MASS INDEX: 29.12 KG/M2 | HEIGHT: 64 IN | SYSTOLIC BLOOD PRESSURE: 114 MMHG | DIASTOLIC BLOOD PRESSURE: 72 MMHG

## 2021-01-07 DIAGNOSIS — D25.2 INTRAMURAL AND SUBSEROUS LEIOMYOMA OF UTERUS: ICD-10-CM

## 2021-01-07 DIAGNOSIS — D50.0 IRON DEFICIENCY ANEMIA DUE TO CHRONIC BLOOD LOSS: ICD-10-CM

## 2021-01-07 DIAGNOSIS — D25.1 INTRAMURAL AND SUBSEROUS LEIOMYOMA OF UTERUS: ICD-10-CM

## 2021-01-07 DIAGNOSIS — N92.0 MENORRHAGIA WITH REGULAR CYCLE: Primary | ICD-10-CM

## 2021-01-07 PROBLEM — Z01.419 ROUTINE GYNECOLOGICAL EXAMINATION: Status: RESOLVED | Noted: 2020-03-21 | Resolved: 2021-01-07

## 2021-01-07 PROBLEM — Z01.419 GYNECOLOGIC EXAM NORMAL: Status: RESOLVED | Noted: 2019-04-17 | Resolved: 2021-01-07

## 2021-01-07 PROCEDURE — 99213 OFFICE O/P EST LOW 20 MIN: CPT | Performed by: OBSTETRICS & GYNECOLOGY

## 2021-01-07 RX ORDER — METOPROLOL TARTRATE 100 MG/1
100 TABLET ORAL
COMMUNITY
Start: 2018-01-08

## 2021-01-07 RX ORDER — PANTOPRAZOLE SODIUM 40 MG/1
40 TABLET, DELAYED RELEASE ORAL DAILY
COMMUNITY
Start: 2020-10-01 | End: 2021-10-01

## 2021-01-07 NOTE — PROGRESS NOTES
Assessment/Plan:    Enlarged fibroid uterus to about U -1, menorrhagia dysmenorrhea and anemia  Would like definitive surgery with hysterectomy  Will schedule for CHICHO-BS  Risks and benefits reviewed as well as anticipated postoperative recovery  All questions answered  Will return for preop H&P       Problem List Items Addressed This Visit        Genitourinary    Uterine leiomyoma       Other    Menorrhagia with regular cycle - Primary    Iron deficiency anemia due to chronic blood loss            Subjective:      Patient ID: Zo Salguero is a 45 y o  female  Here for discussion about hysterectomy - menses are regular and heavy - worse first 4-5 days for 7-8 with intermenstrual bleeding for a few days - dysmenorrhea - lightheaded and dizzy and slight nausea and does affect lifestyle - done with child bearing - last US with fibroids - multiple - between 5-7 cm - does have anemia -   After exam reviewed options - enlarged fibroid uterus - will proceed with CHICHO - BS - risks and benefits and anticipated recovery reviewed  Questions answered  Will schedule and return for preop       The following portions of the patient's history were reviewed and updated as appropriate:   She  has a past medical history of Abnormal Pap smear of cervix, Depression, Fibroid, GERD (gastroesophageal reflux disease), Hypertension, and Iron deficiency anemia  She   Patient Active Problem List    Diagnosis Date Noted    Iron deficiency anemia due to chronic blood loss 2021    Hypokalemia 2019    Menorrhagia with regular cycle 2019    Special screening for malignant neoplasm 2019    Uterine leiomyoma 2016    Essential hypertension 2013     She  has a past surgical history that includes Cholecystectomy;  section; Gastric restriction surgery; Colposcopy (); and Bariatric Surgery (2018)  Her family history includes Diabetes in her mother and sister;  Heart disease in her father, maternal grandmother, and paternal grandmother; Heart failure in her father and maternal grandmother; Hypertension in her brother, mother, sister, and sister; Thyroid disease in her mother  She  reports that she has never smoked  She has never used smokeless tobacco  She reports current alcohol use  She reports that she does not use drugs  Current Outpatient Medications   Medication Sig Dispense Refill    clotrimazole-betamethasone (LOTRISONE) 1-0 05 % cream 2 (two) times a day Apply sparingly to affected area      ferrous sulfate 325 (65 Fe) mg tablet Take 325 mg by mouth daily with breakfast      metoprolol tartrate (LOPRESSOR) 100 mg tablet Take 100 mg by mouth      Multiple Vitamins-Minerals (MULTIVITAMIN ADULT PO) Take 1 tablet by mouth      norethindrone (MICRONOR) 0 35 MG tablet Take 1 tablet (0 35 mg total) by mouth daily 84 tablet 3    pantoprazole (PROTONIX) 40 mg tablet Take 40 mg by mouth daily       No current facility-administered medications for this visit        Current Outpatient Medications on File Prior to Visit   Medication Sig    clotrimazole-betamethasone (LOTRISONE) 1-0 05 % cream 2 (two) times a day Apply sparingly to affected area    ferrous sulfate 325 (65 Fe) mg tablet Take 325 mg by mouth daily with breakfast    metoprolol tartrate (LOPRESSOR) 100 mg tablet Take 100 mg by mouth    Multiple Vitamins-Minerals (MULTIVITAMIN ADULT PO) Take 1 tablet by mouth    norethindrone (MICRONOR) 0 35 MG tablet Take 1 tablet (0 35 mg total) by mouth daily    pantoprazole (PROTONIX) 40 mg tablet Take 40 mg by mouth daily    [DISCONTINUED] ergocalciferol (ERGOCALCIFEROL) 12295 units capsule     [DISCONTINUED] metoprolol tartrate (LOPRESSOR) 50 mg tablet     [DISCONTINUED] naproxen (NAPROSYN) 500 mg tablet Take 1 tablet (500 mg total) by mouth 2 (two) times a day with meals    [DISCONTINUED] norelgestromin-ethinyl estradiol (ORTHO EVRA) 150-35 MCG/24HR Place 1 patch on the skin once a week    [DISCONTINUED] omeprazole (PriLOSEC) 20 mg delayed release capsule Take 20 mg by mouth     No current facility-administered medications on file prior to visit  She has No Known Allergies       Review of Systems   Constitutional: Negative for chills, fatigue, fever and unexpected weight change  HENT: Negative for dental problem, sinus pressure and sinus pain  Eyes: Negative for visual disturbance  Respiratory: Negative for cough, shortness of breath and wheezing  Cardiovascular: Negative for chest pain and leg swelling  Gastrointestinal: Negative for constipation, diarrhea, nausea and vomiting  Genitourinary: Negative for urgency  Musculoskeletal: Negative for back pain and joint swelling  Allergic/Immunologic: Negative for environmental allergies  Neurological: Negative for dizziness and headaches  Psychiatric/Behavioral: The patient is not nervous/anxious  Objective:      /72 (BP Location: Left arm, Patient Position: Sitting, Cuff Size: Standard)   Ht 5' 3 5" (1 613 m)   Wt 77 4 kg (170 lb 9 6 oz)   LMP 12/11/2020 (Exact Date)   BMI 29 75 kg/m²          Physical Exam  Vitals signs reviewed  Constitutional:       General: She is not in acute distress  Appearance: Normal appearance  She is normal weight  She is not ill-appearing  Comments: Young black female   HENT:      Head: Normocephalic and atraumatic  Abdominal:      Palpations: Abdomen is soft  There is mass  Tenderness: There is abdominal tenderness  There is no guarding  Comments: pfannestiel scar  Uterus palpable to U-1 - firm, slightly tender and decreased mobility    Neurological:      General: No focal deficit present  Mental Status: She is alert     Psychiatric:         Mood and Affect: Mood normal          Behavior: Behavior normal

## 2021-02-10 ENCOUNTER — TELEPHONE (OUTPATIENT)
Dept: OBGYN CLINIC | Facility: CLINIC | Age: 41
End: 2021-02-10

## 2021-02-12 ENCOUNTER — APPOINTMENT (OUTPATIENT)
Dept: LAB | Facility: HOSPITAL | Age: 41
End: 2021-02-12
Payer: COMMERCIAL

## 2021-02-12 ENCOUNTER — TRANSCRIBE ORDERS (OUTPATIENT)
Dept: LAB | Facility: CLINIC | Age: 41
End: 2021-02-12

## 2021-02-12 ENCOUNTER — APPOINTMENT (OUTPATIENT)
Dept: LAB | Facility: CLINIC | Age: 41
End: 2021-02-12
Payer: COMMERCIAL

## 2021-02-12 ENCOUNTER — OFFICE VISIT (OUTPATIENT)
Dept: OBGYN CLINIC | Facility: CLINIC | Age: 41
End: 2021-02-12

## 2021-02-12 VITALS
WEIGHT: 170 LBS | DIASTOLIC BLOOD PRESSURE: 82 MMHG | HEIGHT: 64 IN | SYSTOLIC BLOOD PRESSURE: 124 MMHG | BODY MASS INDEX: 29.02 KG/M2

## 2021-02-12 DIAGNOSIS — N92.0 EXCESSIVE OR FREQUENT MENSTRUATION: ICD-10-CM

## 2021-02-12 DIAGNOSIS — D25.9 UTERINE LEIOMYOMA, UNSPECIFIED LOCATION: Primary | ICD-10-CM

## 2021-02-12 DIAGNOSIS — D25.2 INTRAMURAL AND SUBSEROUS LEIOMYOMA OF UTERUS: ICD-10-CM

## 2021-02-12 DIAGNOSIS — D25.9 UTERINE LEIOMYOMA, UNSPECIFIED LOCATION: ICD-10-CM

## 2021-02-12 DIAGNOSIS — N92.0 MENORRHAGIA WITH REGULAR CYCLE: Primary | ICD-10-CM

## 2021-02-12 DIAGNOSIS — N92.0 MENORRHAGIA WITH REGULAR CYCLE: ICD-10-CM

## 2021-02-12 DIAGNOSIS — D25.1 INTRAMURAL AND SUBSEROUS LEIOMYOMA OF UTERUS: ICD-10-CM

## 2021-02-12 DIAGNOSIS — D64.9 ANEMIA, UNSPECIFIED TYPE: ICD-10-CM

## 2021-02-12 DIAGNOSIS — D50.0 IRON DEFICIENCY ANEMIA DUE TO CHRONIC BLOOD LOSS: ICD-10-CM

## 2021-02-12 PROBLEM — Z98.84 S/P LAPAROSCOPIC SLEEVE GASTRECTOMY: Status: ACTIVE | Noted: 2020-07-09

## 2021-02-12 LAB
ABO GROUP BLD: NORMAL
ALBUMIN SERPL BCP-MCNC: 3.2 G/DL (ref 3.5–5)
ALP SERPL-CCNC: 46 U/L (ref 46–116)
ALT SERPL W P-5'-P-CCNC: 16 U/L (ref 12–78)
ANION GAP SERPL CALCULATED.3IONS-SCNC: 6 MMOL/L (ref 4–13)
AST SERPL W P-5'-P-CCNC: 15 U/L (ref 5–45)
BASOPHILS # BLD AUTO: 0.04 THOUSANDS/ΜL (ref 0–0.1)
BASOPHILS NFR BLD AUTO: 1 % (ref 0–1)
BILIRUB SERPL-MCNC: 0.44 MG/DL (ref 0.2–1)
BLD GP AB SCN SERPL QL: NEGATIVE
BUN SERPL-MCNC: 8 MG/DL (ref 5–25)
CALCIUM ALBUM COR SERPL-MCNC: 9.1 MG/DL (ref 8.3–10.1)
CALCIUM SERPL-MCNC: 8.5 MG/DL (ref 8.3–10.1)
CHLORIDE SERPL-SCNC: 106 MMOL/L (ref 100–108)
CO2 SERPL-SCNC: 30 MMOL/L (ref 21–32)
CREAT SERPL-MCNC: 0.91 MG/DL (ref 0.6–1.3)
EOSINOPHIL # BLD AUTO: 0.15 THOUSAND/ΜL (ref 0–0.61)
EOSINOPHIL NFR BLD AUTO: 3 % (ref 0–6)
ERYTHROCYTE [DISTWIDTH] IN BLOOD BY AUTOMATED COUNT: 12.1 % (ref 11.6–15.1)
EST. AVERAGE GLUCOSE BLD GHB EST-MCNC: 88 MG/DL
GFR SERPL CREATININE-BSD FRML MDRD: 92 ML/MIN/1.73SQ M
GLUCOSE P FAST SERPL-MCNC: 82 MG/DL (ref 65–99)
HBA1C MFR BLD: 4.7 %
HCT VFR BLD AUTO: 36.4 % (ref 34.8–46.1)
HGB BLD-MCNC: 11 G/DL (ref 11.5–15.4)
IMM GRANULOCYTES # BLD AUTO: 0 THOUSAND/UL (ref 0–0.2)
IMM GRANULOCYTES NFR BLD AUTO: 0 % (ref 0–2)
LYMPHOCYTES # BLD AUTO: 1.6 THOUSANDS/ΜL (ref 0.6–4.47)
LYMPHOCYTES NFR BLD AUTO: 37 % (ref 14–44)
MCH RBC QN AUTO: 29.5 PG (ref 26.8–34.3)
MCHC RBC AUTO-ENTMCNC: 30.2 G/DL (ref 31.4–37.4)
MCV RBC AUTO: 98 FL (ref 82–98)
MONOCYTES # BLD AUTO: 0.6 THOUSAND/ΜL (ref 0.17–1.22)
MONOCYTES NFR BLD AUTO: 14 % (ref 4–12)
NEUTROPHILS # BLD AUTO: 1.97 THOUSANDS/ΜL (ref 1.85–7.62)
NEUTS SEG NFR BLD AUTO: 45 % (ref 43–75)
NRBC BLD AUTO-RTO: 0 /100 WBCS
PLATELET # BLD AUTO: 317 THOUSANDS/UL (ref 149–390)
PMV BLD AUTO: 9.6 FL (ref 8.9–12.7)
POTASSIUM SERPL-SCNC: 3.7 MMOL/L (ref 3.5–5.3)
PROT SERPL-MCNC: 7.6 G/DL (ref 6.4–8.2)
RBC # BLD AUTO: 3.73 MILLION/UL (ref 3.81–5.12)
RH BLD: POSITIVE
SODIUM SERPL-SCNC: 142 MMOL/L (ref 136–145)
SPECIMEN EXPIRATION DATE: NORMAL
WBC # BLD AUTO: 4.36 THOUSAND/UL (ref 4.31–10.16)

## 2021-02-12 PROCEDURE — 80053 COMPREHEN METABOLIC PANEL: CPT

## 2021-02-12 PROCEDURE — 86850 RBC ANTIBODY SCREEN: CPT

## 2021-02-12 PROCEDURE — 86900 BLOOD TYPING SEROLOGIC ABO: CPT

## 2021-02-12 PROCEDURE — 86901 BLOOD TYPING SEROLOGIC RH(D): CPT

## 2021-02-12 PROCEDURE — 36415 COLL VENOUS BLD VENIPUNCTURE: CPT

## 2021-02-12 PROCEDURE — PREOP: Performed by: OBSTETRICS & GYNECOLOGY

## 2021-02-12 PROCEDURE — 85025 COMPLETE CBC W/AUTO DIFF WBC: CPT

## 2021-02-12 PROCEDURE — 83036 HEMOGLOBIN GLYCOSYLATED A1C: CPT

## 2021-02-12 NOTE — H&P (VIEW-ONLY)
Assessment/Plan:    Enlarged fibroid uterus to about U -1, menorrhagia dysmenorrhea and anemia, for CHICHO-BS  Risks and benefits reviewed as well as anticipated postoperative recovery  All questions answered  Consents reviewed and signed  H and P done       Problem List Items Addressed This Visit        Genitourinary    Uterine leiomyoma       Other    Menorrhagia with regular cycle - Primary    Iron deficiency anemia due to chronic blood loss            Subjective:      Patient ID: Jennifer Aponte is a 45 y o  female  Jace Caruso is here for her preop visit for her total abdominal hysterectomy with bilateral salpingectomy  Her menses are regular and heavy - worse first 4-5 days for 7-8 with intermenstrual bleeding for a few days - dysmenorrhea - lightheaded and dizzy and slight nausea and does affect lifestyle - done with child bearing - last US with fibroids - multiple - between 5-7 cm - does have anemia -   Her exam revealed a significantly enlarged fibroid uterus and she desired definitive surgery and to proceed with CHICHO - BS  We again reviewed the risks and benefits associated the procedure and the anticipated postoperative course  Reviewed the consents and they were signed      The following portions of the patient's history were reviewed and updated as appropriate:   She  has a past medical history of Abnormal Pap smear of cervix, Depression, Fibroid, GERD (gastroesophageal reflux disease), Hypertension, and Iron deficiency anemia  She   Patient Active Problem List    Diagnosis Date Noted    Iron deficiency anemia due to chronic blood loss 2021    S/P laparoscopic sleeve gastrectomy 2020    Hypokalemia 2019    Menorrhagia with regular cycle 2019    Special screening for malignant neoplasm 2019    Uterine leiomyoma 2016    Essential hypertension 2013     She  has a past surgical history that includes Cholecystectomy;   section; Gastric restriction surgery; Colposcopy (2010); and Bariatric Surgery (01/08/2018)  Her family history includes Diabetes in her mother and sister; Heart disease in her father, maternal grandmother, and paternal grandmother; Heart failure in her father and maternal grandmother; Hypertension in her brother, mother, sister, and sister; Thyroid disease in her mother  She  reports that she has never smoked  She has never used smokeless tobacco  She reports current alcohol use  She reports that she does not use drugs  Current Outpatient Medications   Medication Sig Dispense Refill    clotrimazole-betamethasone (LOTRISONE) 1-0 05 % cream 2 (two) times a day Apply sparingly to affected area      ferrous sulfate 325 (65 Fe) mg tablet Take 325 mg by mouth daily with breakfast      metoprolol tartrate (LOPRESSOR) 100 mg tablet Take 100 mg by mouth      Multiple Vitamins-Minerals (MULTIVITAMIN ADULT PO) Take 1 tablet by mouth      norethindrone (MICRONOR) 0 35 MG tablet Take 1 tablet (0 35 mg total) by mouth daily 84 tablet 3    pantoprazole (PROTONIX) 40 mg tablet Take 40 mg by mouth daily       No current facility-administered medications for this visit  Current Outpatient Medications on File Prior to Visit   Medication Sig    clotrimazole-betamethasone (LOTRISONE) 1-0 05 % cream 2 (two) times a day Apply sparingly to affected area    ferrous sulfate 325 (65 Fe) mg tablet Take 325 mg by mouth daily with breakfast    metoprolol tartrate (LOPRESSOR) 100 mg tablet Take 100 mg by mouth    Multiple Vitamins-Minerals (MULTIVITAMIN ADULT PO) Take 1 tablet by mouth    norethindrone (MICRONOR) 0 35 MG tablet Take 1 tablet (0 35 mg total) by mouth daily    pantoprazole (PROTONIX) 40 mg tablet Take 40 mg by mouth daily     No current facility-administered medications on file prior to visit  She has No Known Allergies       Review of Systems   Constitutional: Negative for chills, fatigue, fever and unexpected weight change     HENT: Negative for dental problem, sinus pressure and sinus pain  Eyes: Negative for visual disturbance  Respiratory: Negative for cough, shortness of breath and wheezing  Cardiovascular: Negative for chest pain and leg swelling  Gastrointestinal: Negative for constipation, diarrhea, nausea and vomiting  Genitourinary: Negative for urgency  Musculoskeletal: Negative for back pain and joint swelling  Allergic/Immunologic: Negative for environmental allergies  Neurological: Negative for dizziness and headaches  Psychiatric/Behavioral: The patient is not nervous/anxious  Objective:      /82 (BP Location: Left arm, Patient Position: Sitting, Cuff Size: Standard)   Ht 5' 3 5" (1 613 m)   Wt 77 1 kg (170 lb)   BMI 29 64 kg/m²          Physical Exam  Vitals signs reviewed  Constitutional:       General: She is not in acute distress  Appearance: Normal appearance  She is normal weight  She is not ill-appearing  Comments: Young black female   HENT:      Head: Normocephalic and atraumatic  Cardiovascular:      Rate and Rhythm: Normal rate and regular rhythm  Heart sounds: No murmur  Pulmonary:      Effort: Pulmonary effort is normal       Breath sounds: Normal breath sounds  No wheezing  Abdominal:      General: There is no distension  Palpations: Abdomen is soft  There is mass  Tenderness: There is no abdominal tenderness  There is no rebound  Hernia: No hernia is present  Comments: Uterus palpable to U-1 mobile but extending to both sidewalls, nontender  Well-healed Pfannenstiel scar   Neurological:      General: No focal deficit present  Mental Status: She is alert and oriented to person, place, and time     Psychiatric:         Mood and Affect: Mood normal          Behavior: Behavior normal

## 2021-02-16 NOTE — TELEPHONE ENCOUNTER
Spoke with Kat at [facility] and received auth for IP CHICHO  Is approved for 1 day stay and anything after that would need to have clinical documentation to extend, admission date 2/23   [facility]#[id number]

## 2021-02-18 LAB — EXT SARS-COV-2: NOT DETECTED

## 2021-02-20 ENCOUNTER — ANESTHESIA EVENT (OUTPATIENT)
Dept: PERIOP | Facility: HOSPITAL | Age: 41
DRG: 742 | End: 2021-02-20
Payer: COMMERCIAL

## 2021-02-22 NOTE — PRE-PROCEDURE INSTRUCTIONS
Pre-Surgery Instructions:   Medication Instructions    ferrous sulfate 325 (65 Fe) mg tablet Instructed to avoid all ASA/NSAIDs and OTC Vit/Supp from now until after surgery  Tylenol ok prn    metoprolol tartrate (LOPRESSOR) 100 mg tablet Instructed to take per normal schedule including DOS with sips water    Multiple Vitamins-Minerals (MULTIVITAMIN ADULT PO) Instructed to avoid all ASA/NSAIDs and OTC Vit/Supp from now until after surgery  Tylenol ok prn    pantoprazole (PROTONIX) 40 mg tablet Instructed to take per normal schedule including DOS with sips water    Pre op,medications and showering instructions reviewed-Patient has hibiclens  Instructed to avoid all ASA/NSAIDs and OTC Vit/Supp from now until after surgery  Tylenol ok prn  Pt  Verbalized an understanding of all instructions reviewed and offers no concerns at this time

## 2021-02-23 ENCOUNTER — ANESTHESIA (OUTPATIENT)
Dept: PERIOP | Facility: HOSPITAL | Age: 41
DRG: 742 | End: 2021-02-23
Payer: COMMERCIAL

## 2021-02-23 ENCOUNTER — HOSPITAL ENCOUNTER (INPATIENT)
Facility: HOSPITAL | Age: 41
LOS: 2 days | Discharge: HOME | DRG: 742 | End: 2021-02-25
Attending: OBSTETRICS & GYNECOLOGY | Admitting: OBSTETRICS & GYNECOLOGY
Payer: COMMERCIAL

## 2021-02-23 VITALS — HEART RATE: 89 BPM

## 2021-02-23 DIAGNOSIS — D64.9 ANEMIA, UNSPECIFIED TYPE: ICD-10-CM

## 2021-02-23 DIAGNOSIS — D25.9 UTERINE LEIOMYOMA, UNSPECIFIED LOCATION: ICD-10-CM

## 2021-02-23 DIAGNOSIS — N92.0 MENORRHAGIA WITH REGULAR CYCLE: ICD-10-CM

## 2021-02-23 DIAGNOSIS — Z90.710 S/P TAH (TOTAL ABDOMINAL HYSTERECTOMY): Primary | ICD-10-CM

## 2021-02-23 LAB
EXT PREGNANCY TEST URINE: NEGATIVE
EXT. CONTROL: NORMAL
GLUCOSE SERPL-MCNC: 124 MG/DL (ref 65–140)

## 2021-02-23 PROCEDURE — 58150 TOTAL HYSTERECTOMY: CPT | Performed by: OBSTETRICS & GYNECOLOGY

## 2021-02-23 PROCEDURE — 0UT70ZZ RESECTION OF BILATERAL FALLOPIAN TUBES, OPEN APPROACH: ICD-10-PCS | Performed by: OBSTETRICS & GYNECOLOGY

## 2021-02-23 PROCEDURE — 82948 REAGENT STRIP/BLOOD GLUCOSE: CPT

## 2021-02-23 PROCEDURE — 88307 TISSUE EXAM BY PATHOLOGIST: CPT | Performed by: PATHOLOGY

## 2021-02-23 PROCEDURE — C9290 INJ, BUPIVACAINE LIPOSOME: HCPCS | Performed by: ANESTHESIOLOGY

## 2021-02-23 PROCEDURE — 99024 POSTOP FOLLOW-UP VISIT: CPT | Performed by: OBSTETRICS & GYNECOLOGY

## 2021-02-23 PROCEDURE — 0UT90ZZ RESECTION OF UTERUS, OPEN APPROACH: ICD-10-PCS | Performed by: OBSTETRICS & GYNECOLOGY

## 2021-02-23 PROCEDURE — 81025 URINE PREGNANCY TEST: CPT | Performed by: OBSTETRICS & GYNECOLOGY

## 2021-02-23 RX ORDER — ACETAMINOPHEN 325 MG/1
650 TABLET ORAL EVERY 4 HOURS PRN
Status: DISCONTINUED | OUTPATIENT
Start: 2021-02-23 | End: 2021-02-25 | Stop reason: HOSPADM

## 2021-02-23 RX ORDER — PROPOFOL 10 MG/ML
INJECTION, EMULSION INTRAVENOUS CONTINUOUS PRN
Status: DISCONTINUED | OUTPATIENT
Start: 2021-02-23 | End: 2021-02-23

## 2021-02-23 RX ORDER — BUPIVACAINE HYDROCHLORIDE 2.5 MG/ML
INJECTION, SOLUTION EPIDURAL; INFILTRATION; INTRACAUDAL
Status: DISCONTINUED | OUTPATIENT
Start: 2021-02-23 | End: 2021-02-23

## 2021-02-23 RX ORDER — ROCURONIUM BROMIDE 10 MG/ML
INJECTION, SOLUTION INTRAVENOUS AS NEEDED
Status: DISCONTINUED | OUTPATIENT
Start: 2021-02-23 | End: 2021-02-23

## 2021-02-23 RX ORDER — FENTANYL CITRATE/PF 50 MCG/ML
50 SYRINGE (ML) INJECTION
Status: COMPLETED | OUTPATIENT
Start: 2021-02-23 | End: 2021-02-23

## 2021-02-23 RX ORDER — METOPROLOL TARTRATE 50 MG/1
100 TABLET, FILM COATED ORAL DAILY
Status: DISCONTINUED | OUTPATIENT
Start: 2021-02-24 | End: 2021-02-25 | Stop reason: HOSPADM

## 2021-02-23 RX ORDER — EPHEDRINE SULFATE 50 MG/ML
INJECTION INTRAVENOUS AS NEEDED
Status: DISCONTINUED | OUTPATIENT
Start: 2021-02-23 | End: 2021-02-23

## 2021-02-23 RX ORDER — DIPHENHYDRAMINE HYDROCHLORIDE 50 MG/ML
12.5 INJECTION INTRAMUSCULAR; INTRAVENOUS ONCE AS NEEDED
Status: DISCONTINUED | OUTPATIENT
Start: 2021-02-23 | End: 2021-02-23 | Stop reason: HOSPADM

## 2021-02-23 RX ORDER — SODIUM CHLORIDE, SODIUM LACTATE, POTASSIUM CHLORIDE, CALCIUM CHLORIDE 600; 310; 30; 20 MG/100ML; MG/100ML; MG/100ML; MG/100ML
125 INJECTION, SOLUTION INTRAVENOUS CONTINUOUS
Status: DISCONTINUED | OUTPATIENT
Start: 2021-02-23 | End: 2021-02-23

## 2021-02-23 RX ORDER — LIDOCAINE HYDROCHLORIDE 10 MG/ML
0.5 INJECTION, SOLUTION EPIDURAL; INFILTRATION; INTRACAUDAL; PERINEURAL ONCE AS NEEDED
Status: DISCONTINUED | OUTPATIENT
Start: 2021-02-23 | End: 2021-02-23 | Stop reason: HOSPADM

## 2021-02-23 RX ORDER — MAGNESIUM HYDROXIDE 1200 MG/15ML
LIQUID ORAL AS NEEDED
Status: DISCONTINUED | OUTPATIENT
Start: 2021-02-23 | End: 2021-02-23 | Stop reason: HOSPADM

## 2021-02-23 RX ORDER — LIDOCAINE HYDROCHLORIDE 10 MG/ML
INJECTION, SOLUTION EPIDURAL; INFILTRATION; INTRACAUDAL; PERINEURAL AS NEEDED
Status: DISCONTINUED | OUTPATIENT
Start: 2021-02-23 | End: 2021-02-23

## 2021-02-23 RX ORDER — ONDANSETRON 2 MG/ML
4 INJECTION INTRAMUSCULAR; INTRAVENOUS EVERY 6 HOURS PRN
Status: DISCONTINUED | OUTPATIENT
Start: 2021-02-23 | End: 2021-02-25 | Stop reason: HOSPADM

## 2021-02-23 RX ORDER — PANTOPRAZOLE SODIUM 40 MG/1
40 TABLET, DELAYED RELEASE ORAL
Status: DISCONTINUED | OUTPATIENT
Start: 2021-02-24 | End: 2021-02-25 | Stop reason: HOSPADM

## 2021-02-23 RX ORDER — ONDANSETRON 2 MG/ML
INJECTION INTRAMUSCULAR; INTRAVENOUS AS NEEDED
Status: DISCONTINUED | OUTPATIENT
Start: 2021-02-23 | End: 2021-02-23

## 2021-02-23 RX ORDER — SODIUM CHLORIDE, SODIUM LACTATE, POTASSIUM CHLORIDE, CALCIUM CHLORIDE 600; 310; 30; 20 MG/100ML; MG/100ML; MG/100ML; MG/100ML
INJECTION, SOLUTION INTRAVENOUS CONTINUOUS PRN
Status: DISCONTINUED | OUTPATIENT
Start: 2021-02-23 | End: 2021-02-23

## 2021-02-23 RX ORDER — GLYCOPYRROLATE 0.2 MG/ML
INJECTION INTRAMUSCULAR; INTRAVENOUS AS NEEDED
Status: DISCONTINUED | OUTPATIENT
Start: 2021-02-23 | End: 2021-02-23

## 2021-02-23 RX ORDER — OXYCODONE HYDROCHLORIDE 5 MG/1
5 TABLET ORAL EVERY 4 HOURS PRN
Status: DISCONTINUED | OUTPATIENT
Start: 2021-02-23 | End: 2021-02-25 | Stop reason: HOSPADM

## 2021-02-23 RX ORDER — HYDROMORPHONE HCL/PF 1 MG/ML
0.2 SYRINGE (ML) INJECTION
Status: DISCONTINUED | OUTPATIENT
Start: 2021-02-23 | End: 2021-02-23 | Stop reason: HOSPADM

## 2021-02-23 RX ORDER — HYDROMORPHONE HCL/PF 1 MG/ML
0.5 SYRINGE (ML) INJECTION
Status: DISCONTINUED | OUTPATIENT
Start: 2021-02-23 | End: 2021-02-23 | Stop reason: HOSPADM

## 2021-02-23 RX ORDER — PROPOFOL 10 MG/ML
INJECTION, EMULSION INTRAVENOUS AS NEEDED
Status: DISCONTINUED | OUTPATIENT
Start: 2021-02-23 | End: 2021-02-23

## 2021-02-23 RX ORDER — CEFAZOLIN SODIUM 2 G/50ML
2000 SOLUTION INTRAVENOUS EVERY 8 HOURS
Status: DISCONTINUED | OUTPATIENT
Start: 2021-02-23 | End: 2021-02-23

## 2021-02-23 RX ORDER — KETOROLAC TROMETHAMINE 30 MG/ML
15 INJECTION, SOLUTION INTRAMUSCULAR; INTRAVENOUS EVERY 6 HOURS SCHEDULED
Status: COMPLETED | OUTPATIENT
Start: 2021-02-23 | End: 2021-02-24

## 2021-02-23 RX ORDER — SODIUM CHLORIDE, SODIUM LACTATE, POTASSIUM CHLORIDE, CALCIUM CHLORIDE 600; 310; 30; 20 MG/100ML; MG/100ML; MG/100ML; MG/100ML
100 INJECTION, SOLUTION INTRAVENOUS CONTINUOUS
Status: DISCONTINUED | OUTPATIENT
Start: 2021-02-23 | End: 2021-02-24

## 2021-02-23 RX ORDER — DEXAMETHASONE SODIUM PHOSPHATE 10 MG/ML
INJECTION, SOLUTION INTRAMUSCULAR; INTRAVENOUS AS NEEDED
Status: DISCONTINUED | OUTPATIENT
Start: 2021-02-23 | End: 2021-02-23

## 2021-02-23 RX ORDER — NEOSTIGMINE METHYLSULFATE 1 MG/ML
INJECTION INTRAVENOUS AS NEEDED
Status: DISCONTINUED | OUTPATIENT
Start: 2021-02-23 | End: 2021-02-23

## 2021-02-23 RX ORDER — ESMOLOL HYDROCHLORIDE 10 MG/ML
INJECTION INTRAVENOUS AS NEEDED
Status: DISCONTINUED | OUTPATIENT
Start: 2021-02-23 | End: 2021-02-23

## 2021-02-23 RX ORDER — DOCUSATE SODIUM 100 MG/1
100 CAPSULE, LIQUID FILLED ORAL 2 TIMES DAILY
Status: DISCONTINUED | OUTPATIENT
Start: 2021-02-23 | End: 2021-02-25 | Stop reason: HOSPADM

## 2021-02-23 RX ORDER — ONDANSETRON 2 MG/ML
4 INJECTION INTRAMUSCULAR; INTRAVENOUS ONCE AS NEEDED
Status: DISCONTINUED | OUTPATIENT
Start: 2021-02-23 | End: 2021-02-23 | Stop reason: HOSPADM

## 2021-02-23 RX ORDER — HYDRALAZINE HYDROCHLORIDE 20 MG/ML
INJECTION INTRAMUSCULAR; INTRAVENOUS AS NEEDED
Status: DISCONTINUED | OUTPATIENT
Start: 2021-02-23 | End: 2021-02-23

## 2021-02-23 RX ORDER — OXYCODONE HYDROCHLORIDE 10 MG/1
10 TABLET ORAL EVERY 4 HOURS PRN
Status: DISCONTINUED | OUTPATIENT
Start: 2021-02-23 | End: 2021-02-25 | Stop reason: HOSPADM

## 2021-02-23 RX ORDER — SODIUM CHLORIDE 9 MG/ML
INJECTION, SOLUTION INTRAVENOUS CONTINUOUS PRN
Status: DISCONTINUED | OUTPATIENT
Start: 2021-02-23 | End: 2021-02-23

## 2021-02-23 RX ORDER — LABETALOL 20 MG/4 ML (5 MG/ML) INTRAVENOUS SYRINGE
AS NEEDED
Status: DISCONTINUED | OUTPATIENT
Start: 2021-02-23 | End: 2021-02-23

## 2021-02-23 RX ORDER — HYDROMORPHONE HCL 110MG/55ML
PATIENT CONTROLLED ANALGESIA SYRINGE INTRAVENOUS AS NEEDED
Status: DISCONTINUED | OUTPATIENT
Start: 2021-02-23 | End: 2021-02-23

## 2021-02-23 RX ORDER — FENTANYL CITRATE 50 UG/ML
INJECTION, SOLUTION INTRAMUSCULAR; INTRAVENOUS AS NEEDED
Status: DISCONTINUED | OUTPATIENT
Start: 2021-02-23 | End: 2021-02-23

## 2021-02-23 RX ORDER — METOCLOPRAMIDE HYDROCHLORIDE 5 MG/ML
10 INJECTION INTRAMUSCULAR; INTRAVENOUS ONCE AS NEEDED
Status: DISCONTINUED | OUTPATIENT
Start: 2021-02-23 | End: 2021-02-23 | Stop reason: HOSPADM

## 2021-02-23 RX ORDER — MIDAZOLAM HYDROCHLORIDE 2 MG/2ML
INJECTION, SOLUTION INTRAMUSCULAR; INTRAVENOUS AS NEEDED
Status: DISCONTINUED | OUTPATIENT
Start: 2021-02-23 | End: 2021-02-23

## 2021-02-23 RX ADMIN — EPHEDRINE SULFATE 5 MG: 50 INJECTION, SOLUTION INTRAVENOUS at 12:59

## 2021-02-23 RX ADMIN — HYDRALAZINE HYDROCHLORIDE 5 MG: 20 INJECTION, SOLUTION INTRAMUSCULAR; INTRAVENOUS at 15:01

## 2021-02-23 RX ADMIN — GLYCOPYRROLATE 0.4 MG: 0.2 INJECTION, SOLUTION INTRAMUSCULAR; INTRAVENOUS at 14:58

## 2021-02-23 RX ADMIN — Medication 50 MCG: at 15:36

## 2021-02-23 RX ADMIN — LABETALOL 20 MG/4 ML (5 MG/ML) INTRAVENOUS SYRINGE 10 MG: at 13:17

## 2021-02-23 RX ADMIN — SODIUM CHLORIDE: 0.9 INJECTION, SOLUTION INTRAVENOUS at 12:44

## 2021-02-23 RX ADMIN — DEXAMETHASONE SODIUM PHOSPHATE 4 MG: 10 INJECTION, SOLUTION INTRAMUSCULAR; INTRAVENOUS at 13:20

## 2021-02-23 RX ADMIN — PHENYLEPHRINE HYDROCHLORIDE 50 MCG: 10 INJECTION INTRAVENOUS at 12:59

## 2021-02-23 RX ADMIN — FENTANYL CITRATE 100 MCG: 50 INJECTION INTRAMUSCULAR; INTRAVENOUS at 12:41

## 2021-02-23 RX ADMIN — HYDROMORPHONE HYDROCHLORIDE 0.2 MG: 2 INJECTION, SOLUTION INTRAMUSCULAR; INTRAVENOUS; SUBCUTANEOUS at 13:29

## 2021-02-23 RX ADMIN — DOCUSATE SODIUM 100 MG: 100 CAPSULE, LIQUID FILLED ORAL at 17:50

## 2021-02-23 RX ADMIN — CEFAZOLIN SODIUM 1000 MG: 2 SOLUTION INTRAVENOUS at 12:50

## 2021-02-23 RX ADMIN — METHYLENE BLUE 40 MG: 5 INJECTION INTRAVENOUS at 13:45

## 2021-02-23 RX ADMIN — BUPIVACAINE HYDROCHLORIDE 20 ML: 2.5 INJECTION, SOLUTION EPIDURAL; INFILTRATION; INTRACAUDAL at 14:56

## 2021-02-23 RX ADMIN — HYDROMORPHONE HYDROCHLORIDE 0.1 MG: 2 INJECTION, SOLUTION INTRAMUSCULAR; INTRAVENOUS; SUBCUTANEOUS at 15:16

## 2021-02-23 RX ADMIN — SODIUM CHLORIDE: 0.9 INJECTION, SOLUTION INTRAVENOUS at 14:30

## 2021-02-23 RX ADMIN — PROPOFOL 200 MG: 10 INJECTION, EMULSION INTRAVENOUS at 12:41

## 2021-02-23 RX ADMIN — FENTANYL CITRATE 50 MCG: 50 INJECTION INTRAMUSCULAR; INTRAVENOUS at 13:11

## 2021-02-23 RX ADMIN — MIDAZOLAM 1 MG: 1 INJECTION INTRAMUSCULAR; INTRAVENOUS at 12:34

## 2021-02-23 RX ADMIN — LABETALOL 20 MG/4 ML (5 MG/ML) INTRAVENOUS SYRINGE 5 MG: at 13:29

## 2021-02-23 RX ADMIN — LIDOCAINE HYDROCHLORIDE 50 MG: 10 INJECTION, SOLUTION EPIDURAL; INFILTRATION; INTRACAUDAL; PERINEURAL at 12:41

## 2021-02-23 RX ADMIN — ONDANSETRON 4 MG: 2 INJECTION INTRAMUSCULAR; INTRAVENOUS at 17:58

## 2021-02-23 RX ADMIN — LABETALOL 20 MG/4 ML (5 MG/ML) INTRAVENOUS SYRINGE 5 MG: at 13:22

## 2021-02-23 RX ADMIN — ROCURONIUM BROMIDE 50 MG: 50 INJECTION, SOLUTION INTRAVENOUS at 12:42

## 2021-02-23 RX ADMIN — MIDAZOLAM 1 MG: 1 INJECTION INTRAMUSCULAR; INTRAVENOUS at 12:41

## 2021-02-23 RX ADMIN — HYDROMORPHONE HYDROCHLORIDE 0.2 MG: 2 INJECTION, SOLUTION INTRAMUSCULAR; INTRAVENOUS; SUBCUTANEOUS at 15:10

## 2021-02-23 RX ADMIN — ONDANSETRON 4 MG: 2 INJECTION INTRAMUSCULAR; INTRAVENOUS at 14:45

## 2021-02-23 RX ADMIN — ROCURONIUM BROMIDE 10 MG: 50 INJECTION, SOLUTION INTRAVENOUS at 13:49

## 2021-02-23 RX ADMIN — ROCURONIUM BROMIDE 10 MG: 50 INJECTION, SOLUTION INTRAVENOUS at 14:04

## 2021-02-23 RX ADMIN — HYDRALAZINE HYDROCHLORIDE 5 MG: 20 INJECTION, SOLUTION INTRAMUSCULAR; INTRAVENOUS at 13:53

## 2021-02-23 RX ADMIN — SODIUM CHLORIDE, SODIUM LACTATE, POTASSIUM CHLORIDE, AND CALCIUM CHLORIDE: .6; .31; .03; .02 INJECTION, SOLUTION INTRAVENOUS at 12:33

## 2021-02-23 RX ADMIN — FENTANYL CITRATE 25 MCG: 50 INJECTION INTRAMUSCULAR; INTRAVENOUS at 14:16

## 2021-02-23 RX ADMIN — NEOSTIGMINE METHYLSULFATE 3 MG: 1 INJECTION INTRAVENOUS at 14:58

## 2021-02-23 RX ADMIN — PROPOFOL 30 MCG/KG/MIN: 10 INJECTION, EMULSION INTRAVENOUS at 13:09

## 2021-02-23 RX ADMIN — Medication 50 MCG: at 15:59

## 2021-02-23 RX ADMIN — SODIUM CHLORIDE, SODIUM LACTATE, POTASSIUM CHLORIDE, AND CALCIUM CHLORIDE 100 ML/HR: .6; .31; .03; .02 INJECTION, SOLUTION INTRAVENOUS at 16:19

## 2021-02-23 RX ADMIN — ESMOLOL HYDROCHLORIDE 20 MG: 100 INJECTION, SOLUTION INTRAVENOUS at 13:14

## 2021-02-23 RX ADMIN — FENTANYL CITRATE 25 MCG: 50 INJECTION INTRAMUSCULAR; INTRAVENOUS at 14:21

## 2021-02-23 RX ADMIN — OXYCODONE HYDROCHLORIDE 5 MG: 5 TABLET ORAL at 22:20

## 2021-02-23 RX ADMIN — Medication 50 MCG: at 16:11

## 2021-02-23 RX ADMIN — KETOROLAC TROMETHAMINE 15 MG: 30 INJECTION, SOLUTION INTRAMUSCULAR; INTRAVENOUS at 17:50

## 2021-02-23 NOTE — DISCHARGE INSTRUCTIONS
Abdominal Hysterectomy   WHAT YOU SHOULD KNOW:   An abdominal hysterectomy (AH) is surgery to remove your uterus  Your uterus will be removed through an incision in your abdomen  You may need an AH if you have a tumor in your uterus or other reproductive organs  You may also need an AH if you have an infection, pain, or bleeding  AFTER YOU LEAVE:   Medicines:   · Pain medicine: You may be given medicine to take away or decrease pain  Do not wait until the pain is severe before you take your medicine  · Antinausea medicine: This medicine may be given to calm your stomach and prevent vomiting  · Blood thinners  help prevent blood clots  Blood thinners may be given before, during, and after a surgery or procedure  Blood thinners make it more likely for you to bleed or bruise  · Take your medicine as directed  Call your healthcare provider if you think your medicine is not helping or if you have side effects  Tell him if you are allergic to any medicine  Keep a list of the medicines, vitamins, and herbs you take  Include the amounts, and when and why you take them  Bring the list or the pill bottles to follow-up visits  Carry your medicine list with you in case of an emergency  Follow up with your primary healthcare provider or gynecologist as directed:  Ask how to care for your wound  You may need blood tests, x-rays, or ultrasounds at your follow-up visits  Write down your questions so you remember to ask them during your visits  Limit activity until you have fully recovered from surgery:   · Ask when it is safe for you to drive, walk up stairs, lift heavy objects, and have sex  · Ask when it is okay to exercise, and what types of exercise to do  Start slowly and do more as you get stronger  Contact your primary healthcare provider or gynecologist if:   · You have heavy vaginal bleeding that fills 1 or more sanitary pads in 1 hour  · Your wound opens      · You have a fever, and your wound is red and swollen  · You have yellow, green, or bad-smelling discharge coming from your vagina  · You feel new pain or fullness in your vagina  · You have questions or concerns about your surgery, medicine, or care  Seek care immediately or call 911 if:   · You have new or more blood from your vagina or your wound  · Your arm or leg feels warm, tender, and painful  It may look swollen and red  · You suddenly feel lightheaded and have trouble breathing  · You have chest pain  You may have more pain when you take a deep breath or cough  You may cough up blood  © 2014 [address] is for End User's use only and may not be sold, redistributed or otherwise used for commercial purposes  All illustrations and images included in CareNotes® are the copyrighted property of A [employer] A "[employer]  or Cristian Zapien  The above information is an  only  It is not intended as medical advice for individual conditions or treatments  Talk to your doctor, nurse or pharmacist before following any medical regimen to see if it is safe and effective for you

## 2021-02-23 NOTE — ANESTHESIA PREPROCEDURE EVALUATION
Procedure:  HYSTERECTOMY TOTAL ABDOMINAL (CHICHO) BILATERAL SALPINGECTOMY (N/A Abdomen)    Relevant Problems   CARDIO   (+) Essential hypertension      HEMATOLOGY   (+) Iron deficiency anemia due to chronic blood loss      Other   (+) S/P laparoscopic sleeve gastrectomy   (+) Uterine leiomyoma     2/12/21 platelet count 590, denies any anticoagulant/antiplatelet use, no hx of back surgery    Physical Exam    Airway    Mallampati score: II  TM Distance: >3 FB  Neck ROM: full     Dental   No notable dental hx     Cardiovascular      Pulmonary      Other Findings        Anesthesia Plan  ASA Score- 2     Anesthesia Type- general with ASA Monitors  Additional Monitors:   Airway Plan: ETT  Comment: After discussion of risks and benefits of thoracic epidural, including improved pain control, smaller opioid requirement with consequent reduction in associated adverse effects, and less risk for post-op pneumonia/atelectasis, patient declined epidural citing a past experience during labor  After follow-up discussion of risks and benefits, patient did consent to TAP block placement   Plan Factors-Exercise tolerance (METS): >4 METS  Chart reviewed  Existing labs reviewed  Patient summary reviewed  Patient is not a current smoker  Induction- intravenous  Postoperative Plan- Plan for postoperative opioid use  Planned trial extubation    Informed Consent- Anesthetic plan and risks discussed with patient

## 2021-02-23 NOTE — INTERVAL H&P NOTE
H&P reviewed  After examining the patient I find no changes in the patients condition since the H&P had been written    Consent for sterilization again reviewed and signed    Vitals:    02/23/21 1130   BP: 136/97   Pulse: 61   Resp: 16   Temp: (!) 97 3 °F (36 3 °C)   SpO2: 99%

## 2021-02-23 NOTE — ANESTHESIA PROCEDURE NOTES
Peripheral Block    Patient location during procedure: OR  Start time: 2/23/2021 2:56 PM  Reason for block: at surgeon's request and post-op pain management  Staffing  Anesthesiologist: Eben Hammans, MD  Resident/CRNA: Carlota Schultz CRNA  Performed: anesthesiologist   Preanesthetic Checklist  Completed: patient identified, site marked, surgical consent, pre-op evaluation, timeout performed, IV checked, risks and benefits discussed and monitors and equipment checked  Peripheral Block  Patient position: supine  Prep: ChloraPrep  Patient monitoring: heart rate, cardiac monitor, continuous pulse ox and frequent blood pressure checks  Block type: TAP  Laterality: bilateral  Injection technique: single-shot  Procedures: ultrasound guided, Ultrasound guidance required for the procedure to increase accuracy and safety of medication placement and decrease risk of complications  Ultrasound permanent image savedbupivacaine (MARCAINE) 0 25 % perineural infiltration, 20 mL  Needle  Needle type: Stimuplex   Needle length: 10 cm  Needle localization: ultrasound guidance  Assessment  Injection assessment: incremental injection and negative aspiration for heme  Post-procedure:  site cleaned  patient tolerated the procedure well with no immediate complications  Additional Notes  At the conclusion of the case, abdomen was prepared with chlorhexidine  Ultrasound was used to identify left external oblique, internal oblique and transverse abdominus  Stimuplex needle advanced medial to lateral with tip continuously visualized on ultrasound  Needle advanced to plane between internal oblique and transverse abdominus  Aspiration was negative for heme  30 mL of local anesthetic mixture (10 mL exparel, 10 mL 0 25% bupivacaine, 10 mL normal saline) was incrementally injected with intermittent aspiration  No more than 5 mL of local anesthetic injected without aspiration  Aspiration consistently negative for heme   Procedure subsequently repeated on right side with an additional 30 mL of same local anesthetic mixture injected  Images printed, please see paper record

## 2021-02-23 NOTE — OP NOTE
OPERATIVE REPORT  PATIENT NAME: Laurie Zendejas    :  1980  MRN: [id number]  Pt Location: [facility] OR ROOM 15    SURGERY DATE: 2021    Surgeon(s) and Role:     * Cal Montero MD - Primary     * Tara Puentes MD - Assisting     * Mariah Martinez MD - Assisting    Preop Diagnosis:  Uterine leiomyoma, unspecified location [D25 9]  Menorrhagia with regular cycle [N92 0]  Anemia, unspecified type [D64 9]    Post-Op Diagnosis Codes:     * Uterine leiomyoma, unspecified location [D25 9]     * Menorrhagia with regular cycle [N92 0]     * Anemia, unspecified type [D64 9]    Procedure(s) (LRB):  HYSTERECTOMY TOTAL ABDOMINAL (CHICHO) BILATERAL SALPINGECTOMY (N/A)    Specimen(s):  ID Type Source Tests Collected by Time Destination   1 : Uterus, cervix, B/L tubes Tissue Uterus TISSUE EXAM Cal Montero MD 2021 1243        Estimated Blood Loss:   500 mL    Drains:  Urethral Catheter Double-lumen 16 Fr  (Active)   Number of days: 0       Anesthesia Type:   General    Operative Indications:  Uterine leiomyoma, unspecified location [D25 9]  Menorrhagia with regular cycle [N92 0]  Anemia, unspecified type [D64 9]    Operative Findings:  Large 20week fibroid uterus  Grossly normal appearing ovaries and fallopian tubes  Bladder adhered to the anterior aspect of the lower uterine segment  Grossly normal appearing appendix    Complications:   None    Procedure and Technique:     DESCRIPTION OF PROCEDURE:  The patient was taken to the operating room where she was placed under general endotracheal anesthesia without difficulty  A joya catheter was placed in the bladder under aseptic technique  She was prepped and draped in the usual sterile fashion in the dorsal supine position  A Pfannenstiel skin incision was made with scapel and the rest was carried down to the fascia with bovie electrocautery  The fascia was nicked in the midline and extended laterally with bovie electrocautery   The superior aspect of the fascial incision was grasped with Kocher clamps, elevated and the underlying rectus muscles sharply dissected away  The inferior aspect of the incision was grasped with Kocher clamps and sharply dissected away from the rectus muscles  The rectus muscles were  in the midline, and the peritoneum was entered bluntly  The peritoneum was free of adhesions  Peritoneal incision was extended laterally bluntly  The fibroid uterus was pulled through the incision and ligaments were well visualized bilaterally  Upward traction was applied to facilitate exposure  The right fallopian tube was ligated and cut with the Enseal super jaw device and amputated near the cornua  This was removed from the abdomen  The right round ligament was transected with the Enseal super jaw device and the anterior leaf of the broad ligament was incised along the bladder reflection with Metzenbaum scissors to the midline  Attention was then turned to the right utero-ovarian ligament which was transected with the Enseal super jaw device  Attention was then turned to the left side  The left fallopian tube was ligated and cut with the Enseal super jaw device and amputated near the cornua  This was removed from the abdomen  The left round ligament was transected with the Enseal super jaw device and the anterior leaf of the broad ligament was incised along the bladder reflection with Metzenbaum scissors to the midline  A 2cm fibroid was incised on the left side to help facilitate with the bladder reflection  The bladder was dissected off the lower uterine segment with Metzenbaum scissors  The uterine arteries were skeletonized, back clamped and suture ligated with 0 vicryl suture bilaterally  After the first bite with the curved Dorothy clamps and good hemostasis was noted, the uterus was then amputated at the level of the uterine arteries with a scapel and remove   A large Kirit O retractor was then inserted into the abdomen for better visualization  The cardinal ligaments were taken down in serial fashion by clamping, transecting and suture ligating the pedicles with 0 Vicryl  The uterosacral ligaments were clamped with straight Lainz clamps  The cervix and uterus were cut free from the vaginal cuff using right angle Lainz scissors  The vaginal cuff angles were Dorothy ligated using 0 Vicryl suture, incorporating the uterosacral ligaments  The remainder of the cuff was reapproximated using interrupted figure of eight stitches of 0 Vicryl  A few figure of eight stitches with 0 vicryl were thrown near the left side of the vaginal cuff and good hemostasis was noted  Surgifoam was placed onto the cuff  All pedicles were reinspected and noted to be hemostatic  The Kirit retractor was removed  The fascia was reapproximated using 0-Vicryl in two part running fashion  The subcutaneous area was irrigated, suctioned free of clots and debris    The skin was closed with staples  The patient tolerated the procedure well  Sponge, lap, needle and instrument counts were correct times two at the end of the procedure  Pressure dressing was applied, and the patient was awoken from general anesthesia and taken to recovery in stable condition      Patient Disposition:  PACU     SIGNATURE: Laura Toussaint MD  DATE: February 23, 2021  TIME: 2:42 PM

## 2021-02-23 NOTE — PLAN OF CARE
Problem: Potential for Falls  Goal: Patient will remain free of falls  Description: INTERVENTIONS:  - Assess patient frequently for physical needs  -  Identify cognitive and physical deficits and behaviors that affect risk of falls    -  [place] fall precautions as indicated by assessment   - Educate patient/family on patient safety including physical limitations  - Instruct patient to call for assistance with activity based on assessment  - Modify environment to reduce risk of injury  - Consider OT/PT consult to assist with strengthening/mobility  Outcome: Progressing     Problem: PAIN - ADULT  Goal: Verbalizes/displays adequate comfort level or baseline comfort level  Description: Interventions:  - Encourage patient to monitor pain and request assistance  - Assess pain using appropriate pain scale  - Administer analgesics based on type and severity of pain and evaluate response  - Implement non-pharmacological measures as appropriate and evaluate response  - Consider cultural and social influences on pain and pain management  - Notify physician/advanced practitioner if interventions unsuccessful or patient reports new pain  Outcome: Progressing     Problem: INFECTION - ADULT  Goal: Absence or prevention of progression during hospitalization  Description: INTERVENTIONS:  - Assess and monitor for signs and symptoms of infection  - Monitor lab/diagnostic results  - Monitor all insertion sites, i e  indwelling lines, tubes, and drains  - Monitor endotracheal if appropriate and nasal secretions for changes in amount and color  - Strang appropriate cooling/warming therapies per order  - Administer medications as ordered  - Instruct and encourage patient and family to use good hand hygiene technique  - Identify and instruct in appropriate isolation precautions for identified infection/condition  Outcome: Progressing  Goal: Absence of fever/infection during neutropenic period  Description: INTERVENTIONS:  - Monitor WBC    Outcome: Progressing     Problem: SAFETY ADULT  Goal: Maintain or return to baseline ADL function  Description: INTERVENTIONS:  -  Assess patient's ability to carry out ADLs; assess patient's baseline for ADL function and identify physical deficits which impact ability to perform ADLs (bathing, care of mouth/teeth, toileting, grooming, dressing, etc )  - Assess/evaluate cause of self-care deficits   - Assess range of motion  - Assess patient's mobility; develop plan if impaired  - Assess patient's need for assistive devices and provide as appropriate  - Encourage maximum independence but intervene and supervise when necessary  - Involve family in performance of ADLs  - Assess for home care needs following discharge   - Consider OT consult to assist with ADL evaluation and planning for discharge  - Provide patient education as appropriate  Outcome: Progressing  Goal: Maintain or return mobility status to optimal level  Description: INTERVENTIONS:  - Assess patient's baseline mobility status (ambulation, transfers, stairs, etc )    - Identify cognitive and physical deficits and behaviors that affect mobility  - Identify mobility aids required to assist with transfers and/or ambulation (gait belt, sit-to-stand, lift, walker, cane, etc )  - [place] fall precautions as indicated by assessment  - Record patient progress and toleration of activity level on Mobility SBAR; progress patient to next Phase/Stage  - Instruct patient to call for assistance with activity based on assessment  - Consider rehabilitation consult to assist with strengthening/weightbearing, etc   Outcome: Progressing     Problem: DISCHARGE PLANNING  Goal: Discharge to home or other facility with appropriate resources  Description: INTERVENTIONS:  - Identify barriers to discharge w/patient and caregiver  - Arrange for needed discharge resources and transportation as appropriate  - Identify discharge learning needs (meds, wound care, etc )  - Arrange for interpretive services to assist at discharge as needed  - Refer to Case Management Department for coordinating discharge planning if the patient needs post-hospital services based on physician/advanced practitioner order or complex needs related to functional status, cognitive ability, or social support system  Outcome: Progressing     Problem: Knowledge Deficit  Goal: Patient/family/caregiver demonstrates understanding of disease process, treatment plan, medications, and discharge instructions  Description: Complete learning assessment and assess knowledge base    Interventions:  - Provide teaching at level of understanding  - Provide teaching via preferred learning methods  Outcome: Progressing

## 2021-02-23 NOTE — ANESTHESIA POSTPROCEDURE EVALUATION
Post-Op Assessment Note    CV Status:  Stable  Pain Score: 3    Pain management: adequate     Mental Status:  Arousable and sleepy   Hydration Status:  Euvolemic   PONV Controlled:  Controlled   Airway Patency:  Patent      Post Op Vitals Reviewed: Yes      Staff: CRNA         No complications documented      BP  165/78    Temp     Pulse 81   Resp 16   SpO2 100

## 2021-02-23 NOTE — DISCHARGE SUMMARY
Discharge Summary - Gynecology  Thermon Liner 45 y o  female MRN: [id number]  Unit/Bed#: OR [facility] Encounter: [id number]    Admission Date: 2/23/2021   Discharge Date: 2/25/2021    Attending Physician:   Dr Nadeem Hidalgo    Admitting Diagnosis:   Uterine leiomyoma  Iron deficiency anemia  Essential hypertension    Discharge Diagnosis:   As above s/p surgery    Procedures Performed: Total abdominal hysterectomy and bilateral salpingectomy    HPI:  Beba Fraser is a 44 yo P3 with AUB and iron deficiency anemia secondary to leiomyomatous uterus presenting to hospital for scheduled CHICHO and BS on 2/24/2021  Hospital Course:  Octavio Negron underwent an uncomplicated total abdominal hysterectomy and bilateral salpingectomy with an EBL of approximately 500cc  She obtained a TAPS block post-operatively to assist with pain management  Hg went from 11 -> 8 0 on discharge She received venofer during her hospital stay to assist with her anemia  She was discharged on POD #2 with good pain control and appropriate bowel function with plans to follow up in office at her scheduled post-operative appointment in 1 week for staple removal and post-operative exam     Lab Results:   Lab Results   Component Value Date    WBC 4 36 02/12/2021    HGB 11 0 (L) 02/12/2021    HCT 36 4 02/12/2021    MCV 98 02/12/2021     02/12/2021     Lab Results   Component Value Date    GLUCOSE 82 08/12/2015    CALCIUM 8 5 02/12/2021     08/12/2015    K 3 7 02/12/2021    CO2 30 02/12/2021     02/12/2021    BUN 8 02/12/2021    CREATININE 0 91 02/12/2021     No results found for: POCGLU  No results found for: PTT  No results found for: INR, PROTIME    Complications:   None    Condition at Discharge:   good     Discharge Medications:   See after visit summary for reconciled discharge medications provided to patient and family  Discharge instructions: See after visit summary for complete information    Do not place anything (no partner, tampons or douche) in your vagina for 6 weeks  You may walk for exercise for the first 6 weeks then gradually return to your usual activities     Please do not drive until tolerating pain and off of narcotics     You may take baths or shower per your preference     Please look at your incision in the mirror daily and call for redness or tenderness or increased warmth   Call us for increasing redness or steady drainage from the incision     Please call us for temperature > 100 4*F or 38* C, worsening pain or a foul discharge  Provisions for Follow-Up Care:   See after visit summary for information related to follow-up care and any pertinent home health orders  Disposition: Home  Planned Readmission: No    Code Status: Full code  Discharge Statement   I spent 15 minutes discharging the patient  This time was spent on the day of discharge  I had direct contact with the patient on the day of discharge  Additional documentation is required if more than 30 minutes were spent on discharge       Dalieulalio Kennedy MD  02/25/21

## 2021-02-24 LAB
ANION GAP SERPL CALCULATED.3IONS-SCNC: 4 MMOL/L (ref 4–13)
BUN SERPL-MCNC: 8 MG/DL (ref 5–25)
CALCIUM SERPL-MCNC: 8.1 MG/DL (ref 8.3–10.1)
CHLORIDE SERPL-SCNC: 107 MMOL/L (ref 100–108)
CO2 SERPL-SCNC: 26 MMOL/L (ref 21–32)
CREAT SERPL-MCNC: 0.77 MG/DL (ref 0.6–1.3)
ERYTHROCYTE [DISTWIDTH] IN BLOOD BY AUTOMATED COUNT: 11.9 % (ref 11.6–15.1)
GFR SERPL CREATININE-BSD FRML MDRD: 112 ML/MIN/1.73SQ M
GLUCOSE SERPL-MCNC: 123 MG/DL (ref 65–140)
HCT VFR BLD AUTO: 28.3 % (ref 34.8–46.1)
HGB BLD-MCNC: 9 G/DL (ref 11.5–15.4)
MCH RBC QN AUTO: 30.7 PG (ref 26.8–34.3)
MCHC RBC AUTO-ENTMCNC: 31.8 G/DL (ref 31.4–37.4)
MCV RBC AUTO: 97 FL (ref 82–98)
PLATELET # BLD AUTO: 274 THOUSANDS/UL (ref 149–390)
PMV BLD AUTO: 9.4 FL (ref 8.9–12.7)
POTASSIUM SERPL-SCNC: 4.3 MMOL/L (ref 3.5–5.3)
RBC # BLD AUTO: 2.93 MILLION/UL (ref 3.81–5.12)
SODIUM SERPL-SCNC: 137 MMOL/L (ref 136–145)
WBC # BLD AUTO: 9.92 THOUSAND/UL (ref 4.31–10.16)

## 2021-02-24 PROCEDURE — 99024 POSTOP FOLLOW-UP VISIT: CPT | Performed by: OBSTETRICS & GYNECOLOGY

## 2021-02-24 PROCEDURE — 85027 COMPLETE CBC AUTOMATED: CPT | Performed by: STUDENT IN AN ORGANIZED HEALTH CARE EDUCATION/TRAINING PROGRAM

## 2021-02-24 PROCEDURE — 80048 BASIC METABOLIC PNL TOTAL CA: CPT | Performed by: STUDENT IN AN ORGANIZED HEALTH CARE EDUCATION/TRAINING PROGRAM

## 2021-02-24 RX ADMIN — OXYCODONE HYDROCHLORIDE 10 MG: 10 TABLET ORAL at 21:59

## 2021-02-24 RX ADMIN — OXYCODONE HYDROCHLORIDE 5 MG: 5 TABLET ORAL at 17:59

## 2021-02-24 RX ADMIN — METOPROLOL TARTRATE 100 MG: 50 TABLET, FILM COATED ORAL at 08:27

## 2021-02-24 RX ADMIN — KETOROLAC TROMETHAMINE 15 MG: 30 INJECTION, SOLUTION INTRAMUSCULAR; INTRAVENOUS at 12:24

## 2021-02-24 RX ADMIN — DOCUSATE SODIUM 100 MG: 100 CAPSULE, LIQUID FILLED ORAL at 08:27

## 2021-02-24 RX ADMIN — Medication 1 TABLET: at 08:27

## 2021-02-24 RX ADMIN — SODIUM CHLORIDE, SODIUM LACTATE, POTASSIUM CHLORIDE, AND CALCIUM CHLORIDE 100 ML/HR: .6; .31; .03; .02 INJECTION, SOLUTION INTRAVENOUS at 02:23

## 2021-02-24 RX ADMIN — OXYCODONE HYDROCHLORIDE 10 MG: 10 TABLET ORAL at 02:33

## 2021-02-24 RX ADMIN — KETOROLAC TROMETHAMINE 15 MG: 30 INJECTION, SOLUTION INTRAMUSCULAR; INTRAVENOUS at 06:07

## 2021-02-24 RX ADMIN — IRON SUCROSE 200 MG: 20 INJECTION, SOLUTION INTRAVENOUS at 12:25

## 2021-02-24 RX ADMIN — PANTOPRAZOLE SODIUM 40 MG: 40 TABLET, DELAYED RELEASE ORAL at 06:07

## 2021-02-24 RX ADMIN — KETOROLAC TROMETHAMINE 15 MG: 30 INJECTION, SOLUTION INTRAMUSCULAR; INTRAVENOUS at 00:13

## 2021-02-24 RX ADMIN — DOCUSATE SODIUM 100 MG: 100 CAPSULE, LIQUID FILLED ORAL at 17:59

## 2021-02-24 RX ADMIN — OXYCODONE HYDROCHLORIDE 5 MG: 5 TABLET ORAL at 08:27

## 2021-02-24 RX ADMIN — ONDANSETRON 4 MG: 2 INJECTION INTRAMUSCULAR; INTRAVENOUS at 12:27

## 2021-02-24 NOTE — QUICK NOTE
Patient seen on PM rounds in room resting comfortably  Has voided since joya removal and has been up out of bed to chair  Tolerating PO  Some nausea after getting up- settled with zofran  If pain well controlled tomorrow and feeling well anticipate DC home on POD #2  Patient seen with Dr Mazin Cunningham MD  OBGYN Resident PGY4  2:07 PM

## 2021-02-24 NOTE — UTILIZATION REVIEW
Initial Clinical Review    Elective IP surgical procedure    Age/Sex: 45 y o  female     Surgery Date: 2/23/21    Procedure:   Preop Diagnosis:  Uterine leiomyoma, unspecified location [D25 9]  Menorrhagia with regular cycle [N92 0]  Anemia, unspecified type [D64 9]     Post-Op Diagnosis Codes:     * Uterine leiomyoma, unspecified location [D25 9]     * Menorrhagia with regular cycle [N92 0]     * Anemia, unspecified type [D64 9]     Procedure(s) (LRB):  HYSTERECTOMY TOTAL ABDOMINAL (CHICHO) BILATERAL SALPINGECTOMY (N/A    Anesthesia: General     Operative Findings:   Large 20week fibroid uterus, Grossly normal appearing ovaries and fallopian tubes, Bladder adhered to the anterior aspect of the lower uterine segment  Grossly normal appearing appendix  POD#1 Progress Note:   Drop in Hgb to 8 0 today (2 GM), will give IV Venofer today x 1, will remove joya today, ambulate, pain well controlled, eating, mild nausea last evening and today - resolved after 2 doses Zofran  No flatus, voiding trial       Admission Orders: Date/Time/Statement:   Admission Orders (From admission, onward)     Ordered        02/23/21 1435  INPATIENT ADMISSION  Once                   Orders Placed This Encounter   Procedures    INPATIENT ADMISSION     Standing Status:   Standing     Number of Occurrences:   1     Order Specific Question:   Level of Care     Answer:   Med Surg [16]     Order Specific Question:   Estimated length of stay     Answer:   More than 2 Midnights     Order Specific Question:   Certification     Answer:   I certify that inpatient services are medically necessary for this patient for a duration of greater than two midnights  See H&P and MD Progress Notes for additional information about the patient's course of treatment       Vital Signs: /92   Pulse 104   Temp 98 9 °F (37 2 °C)   Resp 18   Ht 5' 3 5" (1 613 m)   Wt 77 1 kg (170 lb)   SpO2 96%   BMI 29 64 kg/m²      Diet: regular     Mobility: OOB and ambulate    DVT Prophylaxis:  SCDs    Medications/Pain Control:   Scheduled Medications:  docusate sodium, 100 mg, Oral, BID  metoprolol tartrate, 100 mg, Oral, Daily  multivitamin-minerals, 1 tablet, Oral, Daily  pantoprazole, 40 mg, Oral, Early Morning    Continuous IV Infusions:     PRN Meds:  acetaminophen, 650 mg, Oral, Q4H PRN  ondansetron, 4 mg, Intravenous, Q6H PRN - x 1 2/23, 2/24  oxyCODONE, 10 mg, Oral, Q4H PRN  -  x1 2/24  oxyCODONE, 5 mg, Oral, Q4H PRN - x 1 2/23, 2/24    Network Utilization Review Department  ATTENTION: Please call with any questions or concerns to [phone] and carefully listen to the prompts so that you are directed to the right person  All voicemails are confidential   Yaa Trent all requests for admission clinical reviews, approved or denied determinations and any other requests to dedicated fax number below belonging to the campus where the patient is receiving treatment   List of dedicated fax numbers for the Facilities:  [address] DENIALS (Administrative/Medical Necessity) [phone]   [address] (Maternity/NICU/Pediatrics) [phone] [address] Savita Mclaughlin 5843 (  Jeffrey Gorman "Maria L" 103) [address] [place] [address] Neena Fang 1481 [address] [place], [place] [address] [phone]

## 2021-02-24 NOTE — CASE MANAGEMENT
Met with pt, explained CM program/CM role  LOS-1  Bundle-no  Unplanned readmission color-green  30 day readmission - no  Resides with SO and children, apartment, 0 [address], BR 2nd floor, 14 steps to reach  I PTA for ADL's/ambulation, drives, employed  PCP [facility] [employer]  Denies HHC/DME/IP Rehab  Denies MH illness, D&A abuse  Primary contact mother Calvin Nunez  No POA/LW  Family will transport home    CM reviewed d/c planning process including the following: identifying help at home, patient preference for d/c planning needs, Discharge Lounge, [facility] Meds to Bed program, availability of treatment team to discuss questions or concerns patient and/or family may have regarding understanding medications and recognizing signs and symptoms once discharged  CM also encouraged patient to follow up with all recommended appointments after discharge  Patient advised of importance for patient and family to participate in managing patients medical well being  Patient/caregiver received discharge checklist  Content reviewed  Patient/caregiver encouraged to participate in discharge plan of care prior to discharge home

## 2021-02-24 NOTE — PROGRESS NOTES
Progress Note - OB/GYN   Zo Salguero 45 y o  female MRN: [id number]  Unit/Bed#: [facility] 910-01 Encounter: [id number]    Assessment/ Plan:  45 y o  female POD#1 s/p total abdominal hysterectomy and bilateral salpingectomy- doing well post-operatively  1  Post-operative care   H 0 -> 9 0 this AM, mild tachycardia   UOP 0 86 cc/kg/hr- remove joya this AM followed by voiding trial   Pain: S/p TAPs block, toradol ATC, tylenol, Rosa 5/10   Encourage ambulation   Encourage incentive spirometry to reduce risk of atelectasis and pneumonia post-operatively    2  Iron deficiency anemia with acute blood loss post-op   H 0 -> 9 0 this AM   Venofer 200mg IV once     3  Essential HTN:   Bps overnight: 150s/80-90s   Continue home metoprolol 100mg PO daily    4  GERD:   Home protonix ordered    5  FEN: Regular diet, DC IVF    6  DVT Ppx: SCDs, ambulation    Disposition: Continue inpatient management until meeting post-operative goals for discharge    Subjective/Objective     Subjective:     Patient seen at bedside this morning resting comfortably about to eat breakfast  Notes abdominal soreness and pain that is improved with medications  She had some nausea overnight that has since resolved and has been able to tolerate crackers and PO fluids  Has not yet been up out of bed to ambulate and has not passed flatus  Joya remains in place  Denies fever, chills, light headedness or dizziness, chest pain, shortness of breath or calf pain  Reviewed post-op day 1 goals with patient including void trial, ambulation and achievement of pain control on PO meds  All questions and concerns addressed to her apparent satisfaction  Objective:     Vitals: Blood pressure 150/92, pulse 104, temperature 98 9 °F (37 2 °C), resp  rate 18, height 5' 3 5" (1 613 m), weight 77 1 kg (170 lb), SpO2 96 %, unknown if currently breastfeeding  Physical Exam:     Physical Exam  Vitals signs reviewed     Constitutional:       General: She is not in acute distress  Appearance: Normal appearance  She is not ill-appearing  HENT:      Head: Normocephalic and atraumatic  Cardiovascular:      Rate and Rhythm: Normal rate and regular rhythm  Heart sounds: No murmur  No gallop  Pulmonary:      Effort: Pulmonary effort is normal  No respiratory distress  Breath sounds: No wheezing or rales  Abdominal:      General: There is no distension  Palpations: Abdomen is soft  Tenderness: There is abdominal tenderness  There is no guarding or rebound  Comments: Appropriate post-op tenderness  Pfannenstiel incision closed with staples are C/D/I   Musculoskeletal:         General: No swelling  Right lower leg: No edema  Left lower leg: No edema  Comments: SCDs in place   Skin:     General: Skin is warm and dry  Coloration: Skin is not pale  Neurological:      Mental Status: She is alert and oriented to person, place, and time  Psychiatric:         Mood and Affect: Mood normal          Behavior: Behavior normal       Comments: Pleasant demeanor           Lab, Imaging and other studies: I have personally reviewed pertinent reports        Lab Results   Component Value Date    WBC 9 92 02/24/2021    HGB 9 0 (L) 02/24/2021    HCT 28 3 (L) 02/24/2021    MCV 97 02/24/2021     02/24/2021               Lorne Yost MD  02/24/21

## 2021-02-25 VITALS
RESPIRATION RATE: 17 BRPM | SYSTOLIC BLOOD PRESSURE: 143 MMHG | OXYGEN SATURATION: 100 % | BODY MASS INDEX: 29.02 KG/M2 | HEART RATE: 68 BPM | WEIGHT: 170 LBS | HEIGHT: 64 IN | TEMPERATURE: 98.5 F | DIASTOLIC BLOOD PRESSURE: 83 MMHG

## 2021-02-25 DIAGNOSIS — Z90.710 S/P TAH (TOTAL ABDOMINAL HYSTERECTOMY): Primary | ICD-10-CM

## 2021-02-25 LAB
BACTERIA UR QL AUTO: ABNORMAL /HPF
BASOPHILS # BLD AUTO: 0.04 THOUSANDS/ΜL (ref 0–0.1)
BASOPHILS NFR BLD AUTO: 1 % (ref 0–1)
BILIRUB UR QL STRIP: ABNORMAL
CLARITY UR: ABNORMAL
COLOR UR: ABNORMAL
EOSINOPHIL # BLD AUTO: 0.11 THOUSAND/ΜL (ref 0–0.61)
EOSINOPHIL NFR BLD AUTO: 2 % (ref 0–6)
ERYTHROCYTE [DISTWIDTH] IN BLOOD BY AUTOMATED COUNT: 12.6 % (ref 11.6–15.1)
GLUCOSE UR STRIP-MCNC: ABNORMAL MG/DL
HCT VFR BLD AUTO: 26 % (ref 34.8–46.1)
HGB BLD-MCNC: 8 G/DL (ref 11.5–15.4)
HGB UR QL STRIP.AUTO: ABNORMAL
IMM GRANULOCYTES # BLD AUTO: 0.04 THOUSAND/UL (ref 0–0.2)
IMM GRANULOCYTES NFR BLD AUTO: 1 % (ref 0–2)
KETONES UR STRIP-MCNC: ABNORMAL MG/DL
LEUKOCYTE ESTERASE UR QL STRIP: ABNORMAL
LYMPHOCYTES # BLD AUTO: 1.29 THOUSANDS/ΜL (ref 0.6–4.47)
LYMPHOCYTES NFR BLD AUTO: 17 % (ref 14–44)
MCH RBC QN AUTO: 30.1 PG (ref 26.8–34.3)
MCHC RBC AUTO-ENTMCNC: 30.8 G/DL (ref 31.4–37.4)
MCV RBC AUTO: 98 FL (ref 82–98)
MONOCYTES # BLD AUTO: 0.51 THOUSAND/ΜL (ref 0.17–1.22)
MONOCYTES NFR BLD AUTO: 7 % (ref 4–12)
NEUTROPHILS # BLD AUTO: 5.53 THOUSANDS/ΜL (ref 1.85–7.62)
NEUTS SEG NFR BLD AUTO: 72 % (ref 43–75)
NITRITE UR QL STRIP: ABNORMAL
NON-SQ EPI CELLS URNS QL MICRO: ABNORMAL /HPF
NRBC BLD AUTO-RTO: 0 /100 WBCS
PH UR STRIP.AUTO: ABNORMAL [PH]
PLATELET # BLD AUTO: 253 THOUSANDS/UL (ref 149–390)
PMV BLD AUTO: 9.6 FL (ref 8.9–12.7)
PROT UR STRIP-MCNC: ABNORMAL MG/DL
RBC # BLD AUTO: 2.66 MILLION/UL (ref 3.81–5.12)
RBC #/AREA URNS AUTO: ABNORMAL /HPF
SP GR UR STRIP.AUTO: 1.01 (ref 1–1.03)
UROBILINOGEN UR QL STRIP.AUTO: ABNORMAL E.U./DL
WBC # BLD AUTO: 7.52 THOUSAND/UL (ref 4.31–10.16)
WBC #/AREA URNS AUTO: ABNORMAL /HPF

## 2021-02-25 PROCEDURE — 81001 URINALYSIS AUTO W/SCOPE: CPT | Performed by: STUDENT IN AN ORGANIZED HEALTH CARE EDUCATION/TRAINING PROGRAM

## 2021-02-25 PROCEDURE — 85025 COMPLETE CBC W/AUTO DIFF WBC: CPT | Performed by: STUDENT IN AN ORGANIZED HEALTH CARE EDUCATION/TRAINING PROGRAM

## 2021-02-25 PROCEDURE — 99024 POSTOP FOLLOW-UP VISIT: CPT | Performed by: OBSTETRICS & GYNECOLOGY

## 2021-02-25 RX ORDER — DOCUSATE SODIUM 100 MG/1
100 CAPSULE, LIQUID FILLED ORAL 2 TIMES DAILY
Qty: 10 CAPSULE | Refills: 0
Start: 2021-02-25

## 2021-02-25 RX ORDER — ACETAMINOPHEN 325 MG/1
650 TABLET ORAL EVERY 4 HOURS PRN
Refills: 0
Start: 2021-02-25

## 2021-02-25 RX ORDER — OXYCODONE HYDROCHLORIDE 5 MG/1
5 TABLET ORAL EVERY 6 HOURS PRN
Qty: 12 TABLET | Refills: 0 | Status: SHIPPED | OUTPATIENT
Start: 2021-02-25 | End: 2021-04-08 | Stop reason: ALTCHOICE

## 2021-02-25 RX ORDER — BISACODYL 10 MG
10 SUPPOSITORY, RECTAL RECTAL DAILY
Status: COMPLETED | OUTPATIENT
Start: 2021-02-25 | End: 2021-02-25

## 2021-02-25 RX ADMIN — DOCUSATE SODIUM 100 MG: 100 CAPSULE, LIQUID FILLED ORAL at 08:19

## 2021-02-25 RX ADMIN — PANTOPRAZOLE SODIUM 40 MG: 40 TABLET, DELAYED RELEASE ORAL at 05:12

## 2021-02-25 RX ADMIN — OXYCODONE HYDROCHLORIDE 5 MG: 5 TABLET ORAL at 15:39

## 2021-02-25 RX ADMIN — BISACODYL 10 MG: 10 SUPPOSITORY RECTAL at 11:11

## 2021-02-25 RX ADMIN — METOPROLOL TARTRATE 100 MG: 50 TABLET, FILM COATED ORAL at 08:19

## 2021-02-25 RX ADMIN — Medication 1 TABLET: at 08:18

## 2021-02-25 RX ADMIN — ACETAMINOPHEN 650 MG: 325 TABLET, FILM COATED ORAL at 08:19

## 2021-02-25 NOTE — QUICK NOTE
Patient seen at bedside this afternoon and reports she is feeling well- she had a BM this afternoon  She denies any fever, chills, N/V, CP or shortness of breath, dysuria or calf pain  Tachycardia has resolved and although elevated temp this AM has never mounted true fever  WBC this afternoon was within normal limits  Patient desires discharge home  Patient to follow up in office in 1 week and discharge instructions reviewed including to please call with any signs of infection (Fever, chills, foul smelling discharge from vagina or incision site) increased vaginal bleeding or any other concerns  All questions answered to her satisfaction      D/w Dr Vargas Archibald MD  02/25/21

## 2021-02-25 NOTE — PROGRESS NOTES
Progress Note - OB/GYN   Pineda Kingston 45 y o  female MRN: [id number]  Unit/Bed#: [facility] 910-01 Encounter: [id number]    Assessment/ Plan:  45 y o  female POD#2 s/p total abdominal hysterectomy and bilateral salpingectomy- doing well post-operatively  1  Post-operative care   Tmax of 100 3 this AM- will monitor temp curve  No white count yesterday and mild tachycardia this AM of 107   CBC w/ diff and UA this AM   Pain: S/p TAPs block, tylenol, Rosa 5/10   Encourage OOB and ambulation   Encourage incentive spirometry to reduce risk of atelectasis and pneumonia post-operatively    2  Iron deficiency anemia with acute blood loss post-op   H 0 -> 9 0 this AM   S/p Venofer 200mg IV once 2021    3  Essential HTN:   Bps overnight: 140-150s/80-90s   Continue home metoprolol 100mg PO daily    4  GERD:   Home protonix ordered    5  FEN: Regular diet, DC IVF    6  DVT Ppx: SCDs, ambulation    Disposition: Continue inpatient management until meeting post-operative goals for discharge    Subjective/Objective     Subjective:   Patient seen at bedside this AM and reports she did well overnight- some mild left shoulder pain she believes that is associated with sleep positioning  Her pain is well controlled on meds  She denies any dizziness or light headedness with ambulation to bathroom and around room  She has been voiding without difficulty but has not yet passed flatus  She otherwise denies fever, chills, N/V, CP, SOB or calf pain  Objective:     Vitals: Blood pressure 149/88, pulse (!) 107, temperature 100 3 °F (37 9 °C), resp  rate 18, height 5' 3 5" (1 613 m), weight 77 1 kg (170 lb), SpO2 94 %, unknown if currently breastfeeding  Physical Exam:     Physical Exam  Vitals signs reviewed  Constitutional:       General: She is not in acute distress  Appearance: Normal appearance  She is not ill-appearing or diaphoretic  Cardiovascular:      Rate and Rhythm: Normal rate and regular rhythm        Heart sounds: Normal heart sounds  No murmur  No gallop  Pulmonary:      Effort: Pulmonary effort is normal  No respiratory distress  Breath sounds: Normal breath sounds  No wheezing, rhonchi or rales  Abdominal:      General: Abdomen is flat  Bowel sounds are normal  There is no distension  Palpations: Abdomen is soft  Tenderness: There is no abdominal tenderness  There is no guarding or rebound  Comments: Pfannenstiel incision closed with staples is C/D/I   Neurological:      Mental Status: She is alert  Lab, Imaging and other studies: I have personally reviewed pertinent reports        Lab Results   Component Value Date    WBC 9 92 02/24/2021    HGB 9 0 (L) 02/24/2021    HCT 28 3 (L) 02/24/2021    MCV 97 02/24/2021     02/24/2021               Celi Lawler MD  02/25/21

## 2021-02-25 NOTE — UTILIZATION REVIEW
Continued Stay Review    Date: 2/25/21                        POD #2 CHICHO with BSO     Current Patient Class: IP  Current Level of Care: MS    HPI:40 y o  female initially admitted on 2/23 for elective surgery - open CHICHO, BSO    Assessment/Plan:   Doing  Well  Febrile to 100 3 this morning  Had IV Venofer 2/24  Pain is well controlled  Ambulating and is not yet passing flatus but is voiding       Pertinent Labs/Diagnostic Results:       Results from last 7 days   Lab Units 02/25/21  0839 02/24/21  0538   WBC Thousand/uL 7 52 9 92   HEMOGLOBIN g/dL 8 0* 9 0*   HEMATOCRIT % 26 0* 28 3*   PLATELETS Thousands/uL 253 274   NEUTROS ABS Thousands/µL 5 53  --          Results from last 7 days   Lab Units 02/24/21  0538   SODIUM mmol/L 137   POTASSIUM mmol/L 4 3   CHLORIDE mmol/L 107   CO2 mmol/L 26   ANION GAP mmol/L 4   BUN mg/dL 8   CREATININE mg/dL 0 77   EGFR ml/min/1 73sq m 112   CALCIUM mg/dL 8 1*         Results from last 7 days   Lab Units 02/23/21  1546   POC GLUCOSE mg/dl 124     Results from last 7 days   Lab Units 02/24/21  0538   GLUCOSE RANDOM mg/dL 123     Vital Signs:   02/25/21 0802  98 4 °F (36 9 °C)  108Abnormal   17  159/93  --  94 %   --  --   SpO2: Vitals done by student nurse at 02/25/21 0802   02/25/21 06:25:08  100 3 °F (37 9 °C)  107Abnormal   18  149/88  108  94 %  --  --   02/25/21 03:11:28  99 3 °F (37 4 °C)  103  20  154/90  111  96 %  --  --   02/24/21 2215  --  --  --  --  --  --  --  None (Room air)   02/24/21 21:59:36  98 4 °F (36 9 °C)  88  18  145/86  106  97 %  --  --   02/24/21 18:51:31  98 8 °F (37 1 °C)  --  18  147/86  106  --  --  --   02/24/21 15:14:08  98 3 °F (36 8 °C)  64  16  146/88  107  99 %  --  --   02/24/21 07:26:33  98 9 °F (37 2 °C)  104  18  150/92  111  96 %  --  --   02/24/21 02:40:44  98 8 °F (37 1 °C)  101  18  150/86  107  96 %  --  --   02/24/21 0100  --  --  --  --  --  96 %  --  --   02/23/21 3198  --  --  --  --  --  --  --  None (Room air)   02/23/21 5283 98 6 °F (37 °C)  75  18  154/85  108  95 %  --  --   02/23/21 19:03:51  98 8 °F (37 1 °C)  85  18  155/98  117  97 %  --  --   02/23/21 16:50:27  97 5 °F (36 4 °C)  76  18  160/89  113  100 %  --  --   02/23/21 1630  --  74  13  126/74  --  100 %  --  --   02/23/21 1615  98 °F (36 7 °C)  72  15  118/74  --  100 %  2 L/min  Nasal cannula   02/23/21 1600  --  74  16  165/80  --  100 %  --  --   02/23/21 1545  --  68  10Abnormal   144/74  --  100 %  --  --   02/23/21 1530  --  74  13  149/82  --  100 %  2 L/min  Nasal cannula   02/23/21 1515  97 3 °F (36 3 °C)Abnormal   80  12  165/95  --  100 %  6 L/min  Simple mask     Medications:   Scheduled Medications:  docusate sodium, 100 mg, Oral, BID  metoprolol tartrate, 100 mg, Oral, Daily  multivitamin-minerals, 1 tablet, Oral, Daily  pantoprazole, 40 mg, Oral, Early Morning      Continuous IV Infusions:     PRN Meds:  acetaminophen, 650 mg, Oral, Q4H PRN - x 1 2/25  ondansetron, 4 mg, Intravenous, Q6H PRN  oxyCODONE, 10 mg, Oral, Q4H PRN - x 2 2/24  oxyCODONE, 5 mg, Oral, Q4H PRN - x 2 2/24    Discharge Plan: home     Network Utilization Review Department  ATTENTION: Please call with any questions or concerns to [phone] and carefully listen to the prompts so that you are directed to the right person  All voicemails are confidential   [employer] all requests for admission clinical reviews, approved or denied determinations and any other requests to dedicated fax number below belonging to the campus where the patient is receiving treatment   List of dedicated fax numbers for the Facilities:  [address] DENIALS (Administrative/Medical Necessity) [phone]   [address] [place] [address] (Maternity/NICU/Pediatrics) 261 [facility],7[facility] Floor [address] [place] [address] [address] (Kaylyn Wade) [address] [place] [address] Neena Fang 1481 [phone]   [place] [address] [phone]

## 2021-02-25 NOTE — PLAN OF CARE
Problem: Potential for Falls  Goal: Patient will remain free of falls  Description: INTERVENTIONS:  - Assess patient frequently for physical needs  -  Identify cognitive and physical deficits and behaviors that affect risk of falls    -  [place] fall precautions as indicated by assessment   - Educate patient/family on patient safety including physical limitations  - Instruct patient to call for assistance with activity based on assessment  - Modify environment to reduce risk of injury  - Consider OT/PT consult to assist with strengthening/mobility  Outcome: Progressing     Problem: PAIN - ADULT  Goal: Verbalizes/displays adequate comfort level or baseline comfort level  Description: Interventions:  - Encourage patient to monitor pain and request assistance  - Assess pain using appropriate pain scale  - Administer analgesics based on type and severity of pain and evaluate response  - Implement non-pharmacological measures as appropriate and evaluate response  - Consider cultural and social influences on pain and pain management  - Notify physician/advanced practitioner if interventions unsuccessful or patient reports new pain  Outcome: Progressing     Problem: INFECTION - ADULT  Goal: Absence or prevention of progression during hospitalization  Description: INTERVENTIONS:  - Assess and monitor for signs and symptoms of infection  - Monitor lab/diagnostic results  - Monitor all insertion sites, i e  indwelling lines, tubes, and drains  - Monitor endotracheal if appropriate and nasal secretions for changes in amount and color  - [place] appropriate cooling/warming therapies per order  - Administer medications as ordered  - Instruct and encourage patient and family to use good hand hygiene technique  - Identify and instruct in appropriate isolation precautions for identified infection/condition  Outcome: Progressing  Goal: Absence of fever/infection during neutropenic period  Description: INTERVENTIONS:  - Monitor WBC    Outcome: Progressing     Problem: SAFETY ADULT  Goal: Maintain or return to baseline ADL function  Description: INTERVENTIONS:  -  Assess patient's ability to carry out ADLs; assess patient's baseline for ADL function and identify physical deficits which impact ability to perform ADLs (bathing, care of mouth/teeth, toileting, grooming, dressing, etc )  - Assess/evaluate cause of self-care deficits   - Assess range of motion  - Assess patient's mobility; develop plan if impaired  - Assess patient's need for assistive devices and provide as appropriate  - Encourage maximum independence but intervene and supervise when necessary  - Involve family in performance of ADLs  - Assess for home care needs following discharge   - Consider OT consult to assist with ADL evaluation and planning for discharge  - Provide patient education as appropriate  Outcome: Progressing  Goal: Maintain or return mobility status to optimal level  Description: INTERVENTIONS:  - Assess patient's baseline mobility status (ambulation, transfers, stairs, etc )    - Identify cognitive and physical deficits and behaviors that affect mobility  - Identify mobility aids required to assist with transfers and/or ambulation (gait belt, sit-to-stand, lift, walker, cane, etc )  - [place] fall precautions as indicated by assessment  - Record patient progress and toleration of activity level on Mobility SBAR; progress patient to next Phase/Stage  - Instruct patient to call for assistance with activity based on assessment  - Consider rehabilitation consult to assist with strengthening/weightbearing, etc   Outcome: Progressing     Problem: DISCHARGE PLANNING  Goal: Discharge to home or other facility with appropriate resources  Description: INTERVENTIONS:  - Identify barriers to discharge w/patient and caregiver  - Arrange for needed discharge resources and transportation as appropriate  - Identify discharge learning needs (meds, wound care, etc )  - Arrange for interpretive services to assist at discharge as needed  - Refer to Case Management Department for coordinating discharge planning if the patient needs post-hospital services based on physician/advanced practitioner order or complex needs related to functional status, cognitive ability, or social support system  Outcome: Progressing     Problem: Knowledge Deficit  Goal: Patient/family/caregiver demonstrates understanding of disease process, treatment plan, medications, and discharge instructions  Description: Complete learning assessment and assess knowledge base    Interventions:  - Provide teaching at level of understanding  - Provide teaching via preferred learning methods  Outcome: Progressing

## 2021-02-26 ENCOUNTER — TELEPHONE (OUTPATIENT)
Dept: OBGYN CLINIC | Facility: CLINIC | Age: 41
End: 2021-02-26

## 2021-02-26 NOTE — UTILIZATION REVIEW
Notification of Discharge  This is a Notification of Discharge from our facility [address]  Please be advised that this patient has been discharge from our facility  Below you will find the admission and discharge date and time including the patients disposition  PRESENTATION DATE: 2/23/2021 10:29 AM  OBS ADMISSION DATE:   IP ADMISSION DATE: 2/23/21 1435   DISCHARGE DATE: 2/25/2021  6:05 PM  DISPOSITION: Home/Self Care Home/Self Care   Admission Orders listed below:  Admission Orders (From admission, onward)     Ordered        02/23/21 6906 [facility]  Once                   Please contact the UR Department if additional information is required to close this patient's authorization/case  [address] Utilization Review Department  Main: [phone] x carefully listen to the prompts  All voicemails are confidential   [email or web address]  Send all requests for admission clinical reviews, approved or denied determinations and any other requests to dedicated fax number below belonging to the campus where the patient is receiving treatment   List of dedicated fax numbers:  [address] DENIALS (Administrative/Medical Necessity) [phone]   [address] (Maternity/NICU/Pediatrics) [phone]   Justice Calzada [phone]   [facility] [phone]   Felicitas Reyes [phone]   [place] [facility] 1525 [facility] [phone]   [facility]  [phone]   2205 [facility] [address]  2401 [facility] [place] 1000 [address] [facility] [phone]

## 2021-02-26 NOTE — TELEPHONE ENCOUNTER
Spoke with patient to check in after CHICHO  Patient states is doing well   Aware to call if needs anything prior to her post op next thursday

## 2021-02-28 ENCOUNTER — NURSE TRIAGE (OUTPATIENT)
Dept: OTHER | Facility: OTHER | Age: 41
End: 2021-02-28

## 2021-02-28 DIAGNOSIS — R30.0 BURNING WITH URINATION: Primary | ICD-10-CM

## 2021-02-28 NOTE — TELEPHONE ENCOUNTER
Reason for Disposition   All other patients with painful urination(Exception: [1] EITHER frequency or urgency AND [2] has on-call doctor)    Answer Assessment - Initial Assessment Questions  1  SEVERITY: "How bad is the pain?"  (e g , Scale 1-10; mild, moderate, or severe)    - MILD (1-3): complains slightly about urination hurting    - MODERATE (4-7): interferes with normal activities      - SEVERE (8-10): excruciating, unwilling or unable to urinate because of the pain       Moderate pain 5/10    2  FREQUENCY: "How many times have you had painful urination today?"       Twice today    3  PATTERN: "Is pain present every time you urinate or just sometimes?"     Yes every time    4  ONSET: "When did the painful urination start?"      2/27    5  FEVER: "Do you have a fever?" If so, ask: "What is your temperature, how was it measured, and when did it start?"     Denies fever    6  PAST UTI: "Have you had a urine infection before?" If so, ask: "When was the last time?" and "What happened that time?"      Yes in the past  Can't remember exact time   7   CAUSE: "What do you think is causing the painful urination?"  (e g , UTI, scratch, Herpes sore)      UTI  8  OTHER SYMPTOMS: "Do you have any other symptoms?" (e g , flank pain, vaginal discharge, genital sores, urgency, blood in urine)   Denies any other symptoms    Protocols used: [facility] PA[facility] - [facility]

## 2021-02-28 NOTE — TELEPHONE ENCOUNTER
Regarding: Post Op Hysterectomy Issue  ----- Message from Viridiana Prieto sent at 2/28/2021 10:49 AM EST -----  "I had a total abdominal hysterectomy 2/23 and was told to call if it burns when I pee and that just started to happen yesterday " [facility]

## 2021-03-01 DIAGNOSIS — R30.0 DYSURIA: Primary | ICD-10-CM

## 2021-03-01 RX ORDER — NITROFURANTOIN 25; 75 MG/1; MG/1
100 CAPSULE ORAL 2 TIMES DAILY
Qty: 14 CAPSULE | Refills: 0 | Status: SHIPPED | OUTPATIENT
Start: 2021-03-01 | End: 2021-04-08 | Stop reason: ALTCHOICE

## 2021-03-01 RX ORDER — NITROFURANTOIN 25; 75 MG/1; MG/1
100 CAPSULE ORAL 2 TIMES DAILY
Qty: 14 CAPSULE | Refills: 0 | Status: CANCELLED | OUTPATIENT
Start: 2021-03-01 | End: 2021-03-08

## 2021-03-01 NOTE — TELEPHONE ENCOUNTER
Spoke with pt  States it will be difficult for pt collect urine sample today  May not be able to get a ride  Rx sent to [facility] pharmacy  Pt aware to contact the office if her symptoms worsen or do not improve

## 2021-03-01 NOTE — TELEPHONE ENCOUNTER
Pt states painful urination started 2/27/21  Describes as burning feeling with urination  Unsure if she is able to completely empty  Denies pressure or frequency  Denies fever or chills  Pt has post-op apt 3/4/21  Pt aware will review with Dr Eduardo Jauregui

## 2021-03-01 NOTE — TELEPHONE ENCOUNTER
Dr Hernández Marking- would recommend collecting urine sample but if she can not do that we can treat with Mircobid 100 mg BID x 7 days

## 2021-03-04 ENCOUNTER — OFFICE VISIT (OUTPATIENT)
Dept: OBGYN CLINIC | Facility: CLINIC | Age: 41
End: 2021-03-04

## 2021-03-04 VITALS
SYSTOLIC BLOOD PRESSURE: 128 MMHG | WEIGHT: 163.6 LBS | BODY MASS INDEX: 27.93 KG/M2 | DIASTOLIC BLOOD PRESSURE: 80 MMHG | HEIGHT: 64 IN

## 2021-03-04 DIAGNOSIS — Z90.710 S/P TAH (TOTAL ABDOMINAL HYSTERECTOMY): Primary | ICD-10-CM

## 2021-03-04 DIAGNOSIS — Z48.816 AFTERCARE FOLLOWING SURGERY OF THE GENITOURINARY SYSTEM: ICD-10-CM

## 2021-03-04 PROCEDURE — 99024 POSTOP FOLLOW-UP VISIT: CPT | Performed by: OBSTETRICS & GYNECOLOGY

## 2021-03-04 NOTE — PROGRESS NOTES
Assessment/Plan:    Normal postoperative check, staples removed without incident  Patient is allowed to slowly increase her activity as tolerated, no heavy lifting and maintain pelvic rest   Return to office in 2 weeks for continued postoperative surveillance       Problem List Items Addressed This Visit        Other    Aftercare following surgery of the genitourinary system    S/P CHICHO (total abdominal hysterectomy) - Primary            Subjective:      Patient ID: Steve Schwab is a 45 y o  female  Sarah Horan is here today for postoperative visit  She underwent a total abdominal hysterectomy with bilateral salpingectomy without complication approximately a week ago  She is doing well  She denies any fever chills  She denies any bowel or bladder issues  She has had no nausea or vomiting  She has no menopausal complaints  Reviewed the procedure with her and reviewed the pathology as well as pictures that were available in her chart  The following portions of the patient's history were reviewed and updated as appropriate: allergies, current medications, past family history, past medical history, past social history, past surgical history and problem list     Review of Systems   Constitutional: Negative for chills, fatigue, fever and unexpected weight change  HENT: Negative for dental problem, sinus pressure and sinus pain  Eyes: Negative for visual disturbance  Respiratory: Negative for cough, shortness of breath and wheezing  Cardiovascular: Negative for chest pain and leg swelling  Gastrointestinal: Negative for constipation, diarrhea, nausea and vomiting  Genitourinary: Negative for urgency  Musculoskeletal: Negative for back pain and joint swelling  Allergic/Immunologic: Negative for environmental allergies  Neurological: Negative for dizziness and headaches  Psychiatric/Behavioral: The patient is not nervous/anxious            Objective:      /80 (BP Location: Left arm, Patient Position: Sitting, Cuff Size: Standard)   Ht 5' 3 5" (1 613 m)   Wt 74 2 kg (163 lb 9 6 oz)   BMI 28 53 kg/m²            Physical Exam  Constitutional:       General: She is not in acute distress  Appearance: Normal appearance  She is normal weight  She is not ill-appearing  Comments: Young black female   HENT:      Head: Normocephalic and atraumatic  Abdominal:      General: Abdomen is flat  There is no distension  Palpations: Abdomen is soft  There is no mass  Tenderness: There is no abdominal tenderness  There is no guarding or rebound  Hernia: No hernia is present  Comments: Abdomen is soft  Pfannenstiel incision is healing well, staples are removed without incident  Neurological:      General: No focal deficit present  Mental Status: She is alert and oriented to person, place, and time     Psychiatric:         Mood and Affect: Mood normal          Behavior: Behavior normal

## 2021-03-12 DIAGNOSIS — R30.9 PAINFUL URINATION: Primary | ICD-10-CM

## 2021-03-17 ENCOUNTER — APPOINTMENT (EMERGENCY)
Dept: ULTRASOUND IMAGING | Facility: HOSPITAL | Age: 41
End: 2021-03-17
Payer: COMMERCIAL

## 2021-03-17 ENCOUNTER — TELEPHONE (OUTPATIENT)
Dept: OBGYN CLINIC | Facility: CLINIC | Age: 41
End: 2021-03-17

## 2021-03-17 ENCOUNTER — HOSPITAL ENCOUNTER (EMERGENCY)
Facility: HOSPITAL | Age: 41
Discharge: HOME | End: 2021-03-17
Attending: EMERGENCY MEDICINE | Admitting: EMERGENCY MEDICINE
Payer: COMMERCIAL

## 2021-03-17 VITALS
WEIGHT: 163 LBS | HEART RATE: 82 BPM | BODY MASS INDEX: 28.42 KG/M2 | TEMPERATURE: 97.6 F | OXYGEN SATURATION: 100 % | DIASTOLIC BLOOD PRESSURE: 90 MMHG | RESPIRATION RATE: 18 BRPM | SYSTOLIC BLOOD PRESSURE: 152 MMHG

## 2021-03-17 DIAGNOSIS — N93.9 VAGINA BLEEDING: Primary | ICD-10-CM

## 2021-03-17 DIAGNOSIS — N83.209 OVARIAN CYST: ICD-10-CM

## 2021-03-17 DIAGNOSIS — N93.9 VAGINAL BLEEDING: Primary | ICD-10-CM

## 2021-03-17 LAB
ABO GROUP BLD: NORMAL
ANION GAP SERPL CALCULATED.3IONS-SCNC: 4 MMOL/L (ref 4–13)
BACTERIA UR QL AUTO: NORMAL /HPF
BASOPHILS # BLD AUTO: 0.05 THOUSANDS/ΜL (ref 0–0.1)
BASOPHILS NFR BLD AUTO: 1 % (ref 0–1)
BILIRUB UR QL STRIP: NEGATIVE
BILIRUB UR QL STRIP: NEGATIVE
BLD GP AB SCN SERPL QL: NEGATIVE
BUN SERPL-MCNC: 6 MG/DL (ref 5–25)
CALCIUM SERPL-MCNC: 8.8 MG/DL (ref 8.3–10.1)
CHLORIDE SERPL-SCNC: 105 MMOL/L (ref 100–108)
CLARITY UR: CLEAR
CLARITY UR: CLEAR
CO2 SERPL-SCNC: 28 MMOL/L (ref 21–32)
COLOR UR: ABNORMAL
COLOR UR: YELLOW
CREAT SERPL-MCNC: 0.71 MG/DL (ref 0.6–1.3)
EOSINOPHIL # BLD AUTO: 0.11 THOUSAND/ΜL (ref 0–0.61)
EOSINOPHIL NFR BLD AUTO: 2 % (ref 0–6)
ERYTHROCYTE [DISTWIDTH] IN BLOOD BY AUTOMATED COUNT: 14.6 % (ref 11.6–15.1)
GFR SERPL CREATININE-BSD FRML MDRD: 123 ML/MIN/1.73SQ M
GLUCOSE SERPL-MCNC: 90 MG/DL (ref 65–140)
GLUCOSE UR STRIP-MCNC: NEGATIVE MG/DL
GLUCOSE UR STRIP-MCNC: NEGATIVE MG/DL
HCT VFR BLD AUTO: 27.8 % (ref 34.8–46.1)
HGB BLD-MCNC: 8.4 G/DL (ref 11.5–15.4)
HGB UR QL STRIP.AUTO: ABNORMAL
HGB UR QL STRIP.AUTO: ABNORMAL
IMM GRANULOCYTES # BLD AUTO: 0.02 THOUSAND/UL (ref 0–0.2)
IMM GRANULOCYTES NFR BLD AUTO: 0 % (ref 0–2)
KETONES UR STRIP-MCNC: NEGATIVE MG/DL
KETONES UR STRIP-MCNC: NEGATIVE MG/DL
LEUKOCYTE ESTERASE UR QL STRIP: ABNORMAL
LEUKOCYTE ESTERASE UR QL STRIP: ABNORMAL
LYMPHOCYTES # BLD AUTO: 1.21 THOUSANDS/ΜL (ref 0.6–4.47)
LYMPHOCYTES NFR BLD AUTO: 22 % (ref 14–44)
MCH RBC QN AUTO: 29.3 PG (ref 26.8–34.3)
MCHC RBC AUTO-ENTMCNC: 30.2 G/DL (ref 31.4–37.4)
MCV RBC AUTO: 97 FL (ref 82–98)
MONOCYTES # BLD AUTO: 0.35 THOUSAND/ΜL (ref 0.17–1.22)
MONOCYTES NFR BLD AUTO: 7 % (ref 4–12)
NEUTROPHILS # BLD AUTO: 3.68 THOUSANDS/ΜL (ref 1.85–7.62)
NEUTS SEG NFR BLD AUTO: 68 % (ref 43–75)
NITRITE UR QL STRIP: NEGATIVE
NITRITE UR QL STRIP: NEGATIVE
NON-SQ EPI CELLS URNS QL MICRO: NORMAL /HPF
NRBC BLD AUTO-RTO: 0 /100 WBCS
PH UR STRIP.AUTO: 6 [PH]
PH UR STRIP.AUTO: 6 [PH] (ref 4.5–8)
PLATELET # BLD AUTO: 549 THOUSANDS/UL (ref 149–390)
PMV BLD AUTO: 8.5 FL (ref 8.9–12.7)
POTASSIUM SERPL-SCNC: 4.8 MMOL/L (ref 3.5–5.3)
PROT UR STRIP-MCNC: NEGATIVE MG/DL
PROT UR STRIP-MCNC: NEGATIVE MG/DL
RBC # BLD AUTO: 2.87 MILLION/UL (ref 3.81–5.12)
RBC #/AREA URNS AUTO: NORMAL /HPF
RH BLD: POSITIVE
SODIUM SERPL-SCNC: 137 MMOL/L (ref 136–145)
SP GR UR STRIP.AUTO: 1.02 (ref 1–1.03)
SP GR UR STRIP.AUTO: >=1.03 (ref 1–1.03)
SPECIMEN EXPIRATION DATE: NORMAL
UROBILINOGEN UR QL STRIP.AUTO: 1 E.U./DL
UROBILINOGEN UR QL STRIP.AUTO: 1 E.U./DL
WBC # BLD AUTO: 5.42 THOUSAND/UL (ref 4.31–10.16)
WBC #/AREA URNS AUTO: NORMAL /HPF

## 2021-03-17 PROCEDURE — 80048 BASIC METABOLIC PNL TOTAL CA: CPT | Performed by: PHYSICIAN ASSISTANT

## 2021-03-17 PROCEDURE — 99285 EMERGENCY DEPT VISIT HI MDM: CPT | Performed by: PHYSICIAN ASSISTANT

## 2021-03-17 PROCEDURE — 76856 US EXAM PELVIC COMPLETE: CPT

## 2021-03-17 PROCEDURE — 96361 HYDRATE IV INFUSION ADD-ON: CPT

## 2021-03-17 PROCEDURE — 86850 RBC ANTIBODY SCREEN: CPT | Performed by: EMERGENCY MEDICINE

## 2021-03-17 PROCEDURE — 85025 COMPLETE CBC W/AUTO DIFF WBC: CPT | Performed by: PHYSICIAN ASSISTANT

## 2021-03-17 PROCEDURE — 99284 EMERGENCY DEPT VISIT MOD MDM: CPT

## 2021-03-17 PROCEDURE — 86900 BLOOD TYPING SEROLOGIC ABO: CPT | Performed by: EMERGENCY MEDICINE

## 2021-03-17 PROCEDURE — 36415 COLL VENOUS BLD VENIPUNCTURE: CPT | Performed by: PHYSICIAN ASSISTANT

## 2021-03-17 PROCEDURE — 76830 TRANSVAGINAL US NON-OB: CPT

## 2021-03-17 PROCEDURE — 81001 URINALYSIS AUTO W/SCOPE: CPT | Performed by: PHYSICIAN ASSISTANT

## 2021-03-17 PROCEDURE — 99024 POSTOP FOLLOW-UP VISIT: CPT | Performed by: OBSTETRICS & GYNECOLOGY

## 2021-03-17 PROCEDURE — 86901 BLOOD TYPING SEROLOGIC RH(D): CPT | Performed by: EMERGENCY MEDICINE

## 2021-03-17 PROCEDURE — 96360 HYDRATION IV INFUSION INIT: CPT

## 2021-03-17 RX ADMIN — SODIUM CHLORIDE 1000 ML: 0.9 INJECTION, SOLUTION INTRAVENOUS at 11:21

## 2021-03-17 NOTE — TELEPHONE ENCOUNTER
Patient called in, had CHICHO on 2/23 with Dr Dwayne Ovalle and woke up to heavy bleeding that she bled through everything  States seems to be heavy and is bright red  Denies any odor, chills, fever or cramping  Aware will review with MD and call her back  Tiger text sent to doctor to review

## 2021-03-17 NOTE — PROGRESS NOTES
Nya Leon is a 36 y/o P3 status post CHICHO bilateral salpingectomy done on   Patient was having generally normal postop course reported some mild constipation but no straining has been increasing activity but no specific problems  Patient reports no specific heavy lifting and nothing per vagina  Patient woke up this morning and had some mild cramping and had bright red bleeding per vagina  Bleeding has decreased since that time and cramping is stable and decreasing  Patient otherwise feels well        OB History        5    Para   3    Term   3            AB   2    Living   3       SAB   2    TAB        Ectopic        Multiple        Live Births   3               Past Medical History:   Diagnosis Date    Abnormal Pap smear of cervix         Depression     Fibroid     GERD (gastroesophageal reflux disease)     Hypertension     Iron deficiency anemia      Past Surgical History:   Procedure Laterality Date    BARIATRIC SURGERY  2018     SECTION      CHOLECYSTECTOMY      COLPOSCOPY      GASTRIC RESTRICTION SURGERY      [place] TOTAL ABDOM HYSTERECTOMY N/A 2021    Procedure: HYSTERECTOMY TOTAL ABDOMINAL (CHICHO) BILATERAL SALPINGECTOMY;  Surgeon: Alysa Snyder MD;  [facility];  Service: Gynecology     Current Outpatient Medications   Medication Instructions    acetaminophen (TYLENOL) 650 mg, Oral, Every 4 hours PRN    clotrimazole-betamethasone (LOTRISONE) 1-0 05 % cream 2 times daily, Apply sparingly to affected area    docusate sodium (COLACE) 100 mg, Oral, 2 times daily    ferrous sulfate 325 mg, Oral, Daily with breakfast    metoprolol tartrate (LOPRESSOR) 100 mg, Oral    Multiple Vitamins-Minerals (MULTIVITAMIN ADULT PO) 1 tablet, Oral    nitrofurantoin (MACROBID) 100 mg, Oral, 2 times daily    oxyCODONE (ROXICODONE) 5 mg, Oral, Every 6 hours PRN    pantoprazole (PROTONIX) 40 mg, Oral, Daily       No Known Allergies    Social History     Tobacco Use    Smoking status: Never Smoker    Smokeless tobacco: Never Used   Substance Use Topics    Alcohol use: Yes     Frequency: Monthly or less     Comment: occ    Drug use: Never     Physical:  Vitals:    03/17/21 1052   BP: 152/90   Pulse: 82   Resp: 18   Temp: 97 6 °F (36 4 °C)   SpO2: 100%     Abdomen soft non tender positive bowel sounds  Speculum  Some red blood in vault, minimal, no lesions or defect  On bimanual cuff is intact and mildly appropriately tender  Lab Results   Component Value Date    WBC 5 42 03/17/2021    HGB 8 4 (L) 03/17/2021    HCT 27 8 (L) 03/17/2021    MCV 97 03/17/2021     (H) 03/17/2021     Lab Results   Component Value Date    SODIUM 137 03/17/2021    K 4 8 03/17/2021     03/17/2021    CO2 28 03/17/2021    BUN 6 03/17/2021    CREATININE 0 71 03/17/2021    GLUC 90 03/17/2021    CALCIUM 8 8 03/17/2021     Pelvic u/s:  INDICATION:  37years old  Recent hysterectomy  Pain in vaginal bleeding  Patient still has ovaries        COMPARISON: 2/20/2019     TECHNIQUE:   Transabdominal pelvic ultrasound was performed in sagittal and transverse planes with a curvilinear transducer  Additional transvaginal imaging was performed for further assessment of the ovaries and below described mass  Imaging included   volumetric sweeps as well as traditional still imaging technique      FINDINGS:     UTERUS:  Patient is status post hysterectomy approximately one month ago        OVARIES/ADNEXA:  Right ovary:  4 2 x 5 0 x 3 3 cm  Right ovarian cyst measures 4 2 x 2 1 x 3 2 cm  This is predominantly simple cystic although there is some internal debris or septation along the anterior wall  Doppler flow within normal limits      Left ovary: Left ovary difficult to separate from a hemorrhagic mass measuring up to 6 8 x 4 1 x 4 9 cm which also extends inferiorly toward the vaginal cuff        There is a small amount of free fluid     IMPRESSION:     1   Status post hysterectomy approximately one month ago  2  Mildly complex right ovarian cyst measuring 4 2 x 2 1 x 3 2 cm  3  Hematoma identified in the region of the vaginal cuff and extending toward the left adnexa, likely obscuring visualization of the left ovary, with size of 6 8 x 4 1 x 4 9 cm   4   There is a small amount of free fluid within the pelvis  5   Follow-up ultrasound is recommended for reassessment of this complex left vaginal cuff /left adnexal mass and mildly complex right ovarian cyst, and approximately 3 months time      Assessment: 37year-old  about 3 and half weeks post CHICHO bilateral salpingectomy with cuff hematoma that has led to some vaginal bleeding  Patient's hemoglobin is stable and bleeding has resolved  Plan: will have patient observe and call with any concerns discussed that she may see some additional bleeding off and on as the remaining hematoma liquifies and is either absorbed or discharges  Patient has follow up appointment in office next week

## 2021-03-17 NOTE — ED PROVIDER NOTES
History  Chief Complaint   Patient presents with    Vaginal Bleeding     s/p hysterectomy ; reports vaginal bleeding started today, bright red  told to come to ed for further eval by obgyn  The patient is a 78-year-old female with history of hypertension who presents to the emergency department for evaluation of vaginal bleeding  Patient states that she had hysterectomy done on 2021 by Dr Denia Rodriguez  She said that up until today, she has been feeling well  She reports that she woke up this morning, and she noted she was having vaginal bleeding  She additionally reports lower abdominal cramping that is worse on the left  She states that she immediately called Dr Lavon Garg office and was advised to come to the emergency department for evaluation  The patient states that her symptoms feel similar to when she would have a menstrual period previously  She states that she still has both ovaries  She reports some associated nausea with the bleeding  She states that she is concerned that she could have an infection  The patient states that she was recently having some urinary symptoms, and her urine was sent for culture, however she did not have a bacterial infection at that time  She denies fever, chills, vomiting, diarrhea, dysuria, rash, headache or dizziness  History provided by:  Patient   used: No    Vaginal Bleeding  Associated symptoms: nausea    Associated symptoms: no abdominal pain, no back pain, no dizziness, no dysuria and no fever        Prior to Admission Medications   Prescriptions Last Dose Informant Patient Reported? Taking?    Multiple Vitamins-Minerals (MULTIVITAMIN ADULT PO)   Yes No   Sig: Take 1 tablet by mouth   acetaminophen (TYLENOL) 325 mg tablet   No No   Sig: Take 2 tablets (650 mg total) by mouth every 4 (four) hours as needed for mild pain   clotrimazole-betamethasone (LOTRISONE) 1-0 05 % cream   Yes No   Si (two) times a day Apply sparingly to affected area   docusate sodium (COLACE) 100 mg capsule   No No   Sig: Take 1 capsule (100 mg total) by mouth 2 (two) times a day   ferrous sulfate 325 (65 Fe) mg tablet   Yes No   Sig: Take 325 mg by mouth daily with breakfast   metoprolol tartrate (LOPRESSOR) 100 mg tablet   Yes No   Sig: Take 100 mg by mouth   nitrofurantoin (MACROBID) 100 mg capsule   No No   Sig: Take 1 capsule (100 mg total) by mouth 2 (two) times a day   oxyCODONE (ROXICODONE) 5 mg immediate release tablet   No No   Sig: Take 1 tablet (5 mg total) by mouth every 6 (six) hours as needed for severe pain for up to 12 dosesMax Daily Amount: 20 mg   pantoprazole (PROTONIX) 40 mg tablet   Yes No   Sig: Take 40 mg by mouth daily      Facility-Administered Medications: None       Past Medical History:   Diagnosis Date    Abnormal Pap smear of cervix         Depression     Fibroid     GERD (gastroesophageal reflux disease)     Hypertension     Iron deficiency anemia        Past Surgical History:   Procedure Laterality Date    BARIATRIC SURGERY  2018     SECTION      CHOLECYSTECTOMY      COLPOSCOPY      GASTRIC RESTRICTION SURGERY      [place] TOTAL ABDOM HYSTERECTOMY N/A 2021    Procedure: HYSTERECTOMY TOTAL ABDOMINAL (CHICHO) BILATERAL SALPINGECTOMY;  Surgeon: Jyotsna Zazueta MD;  [facility];  Service: Gynecology       Family History   Problem Relation Age of Onset    Diabetes Mother         Borderline    Hypertension Mother     Thyroid disease Mother     Heart disease Father     Heart failure Father         Passed away    Diabetes Sister     Hypertension Sister     Heart disease Paternal Grandmother     Heart failure Maternal Grandmother         Passed away    Heart disease Maternal Grandmother     Hypertension Sister     Hypertension Brother      I have reviewed and agree with the history as documented      E-Cigarette/Vaping    E-Cigarette Use Never User      E-Cigarette/Vaping Substances Social History     Tobacco Use    Smoking status: Never Smoker    Smokeless tobacco: Never Used   Substance Use Topics    Alcohol use: Yes     Frequency: Monthly or less     Comment: occ    Drug use: Never       Review of Systems   Constitutional: Negative for chills and fever  HENT: Negative for ear pain and sore throat  Eyes: Negative for redness and visual disturbance  Respiratory: Negative for cough and shortness of breath  Cardiovascular: Negative for chest pain  Gastrointestinal: Positive for nausea  Negative for abdominal pain, diarrhea and vomiting  Genitourinary: Positive for pelvic pain and vaginal bleeding  Negative for dysuria and hematuria  Musculoskeletal: Negative for back pain, neck pain and neck stiffness  Skin: Negative for color change and rash  Neurological: Negative for dizziness, light-headedness and headaches  All other systems reviewed and are negative  Physical Exam  Physical Exam  Vitals signs and nursing note reviewed  Constitutional:       General: She is not in acute distress  Appearance: She is well-developed  She is not ill-appearing or toxic-appearing  HENT:      Head: Normocephalic and atraumatic  Mouth/Throat:      Pharynx: Uvula midline  Eyes:      General: Lids are normal       Conjunctiva/sclera: Conjunctivae normal    Neck:      Musculoskeletal: Normal range of motion and neck supple  Cardiovascular:      Rate and Rhythm: Normal rate and regular rhythm  Heart sounds: Normal heart sounds  Pulmonary:      Effort: Pulmonary effort is normal       Breath sounds: Normal breath sounds  Abdominal:      General: There is no distension  Palpations: Abdomen is soft  Tenderness: There is no abdominal tenderness  Skin:     General: Skin is warm and dry  Neurological:      Mental Status: She is alert and oriented to person, place, and time           Vital Signs  ED Triage Vitals [03/17/21 1052]   Temperature Pulse Respirations Blood Pressure SpO2   97 6 °F (36 4 °C) 82 18 152/90 100 %      Temp Source Heart Rate Source Patient Position - Orthostatic VS BP Location FiO2 (%)   Oral Monitor Sitting Right arm --      Pain Score       --           Vitals:    03/17/21 1052   BP: 152/90   Pulse: 82   Patient Position - Orthostatic VS: Sitting         Visual Acuity      ED Medications  Medications   sodium chloride 0 9 % bolus 1,000 mL (0 mL Intravenous Stopped 3/17/21 1326)       Diagnostic Studies  Results Reviewed     Procedure Component Value Units Date/Time    Urine Microscopic [[id number]]  (Normal) Collected: 03/17/21 1150    Lab Status: Final result Specimen: Urine, Clean Catch Updated: 03/17/21 1208     RBC, UA 0-1 /hpf      WBC, UA 2-4 /hpf      Epithelial Cells Occasional /hpf      Bacteria, UA Occasional /hpf     Urine Macroscopic, POC [[id number]]  (Abnormal) Collected: 03/17/21 1131    Lab Status: Final result Specimen: Urine Updated: 03/17/21 1205     Color, UA Renee     Clarity, UA Clear     pH, UA 6 0     Leukocytes, UA Trace     Nitrite, UA Negative     Protein, UA Negative mg/dl      Glucose, UA Negative mg/dl      Ketones, UA Negative mg/dl      Urobilinogen, UA 1 0 E U /dl      Bilirubin, UA Negative     Blood, UA Large     Specific Gravity, UA >=1 030    Narrative:      CLINITEK RESULT    UA w Reflex to Microscopic w Reflex to Culture [[id number]]  (Abnormal) Collected: 03/17/21 1150    Lab Status: Final result Specimen: Urine, Clean Catch Updated: 03/17/21 1159     Color, UA Yellow     Clarity, UA Clear     Specific Gravity, UA 1 025     pH, UA 6 0     Leukocytes, UA Trace     Nitrite, UA Negative     Protein, UA Negative mg/dl      Glucose, UA Negative mg/dl      Ketones, UA Negative mg/dl      Urobilinogen, UA 1 0 E U /dl      Bilirubin, UA Negative     Blood, UA Large    CBC and differential [[id number]]  (Abnormal) Collected: 03/17/21 1138    Lab Status: Final result Specimen: Blood from Arm, Left Updated: 03/17/21 1146     WBC 5 42 Thousand/uL      RBC 2 87 Million/uL      Hemoglobin 8 4 g/dL      Hematocrit 27 8 %      MCV 97 fL      MCH 29 3 pg      MCHC 30 2 g/dL      RDW 14 6 %      MPV 8 5 fL      Platelets 834 Thousands/uL      nRBC 0 /100 WBCs      Neutrophils Relative 68 %      Immat GRANS % 0 %      Lymphocytes Relative 22 %      Monocytes Relative 7 %      Eosinophils Relative 2 %      Basophils Relative 1 %      Neutrophils Absolute 3 68 Thousands/µL      Immature Grans Absolute 0 02 Thousand/uL      Lymphocytes Absolute 1 21 Thousands/µL      Monocytes Absolute 0 35 Thousand/µL      Eosinophils Absolute 0 11 Thousand/µL      Basophils Absolute 0 05 Thousands/µL     Basic metabolic panel [[id number]] Collected: 03/17/21 1115    Lab Status: Final result Specimen: Blood from Arm, Right Updated: 03/17/21 1142     Sodium 137 mmol/L      Potassium 4 8 mmol/L      Chloride 105 mmol/L      CO2 28 mmol/L      ANION GAP 4 mmol/L      BUN 6 mg/dL      Creatinine 0 71 mg/dL      Glucose 90 mg/dL      Calcium 8 8 mg/dL      eGFR 123 ml/min/1 73sq m     Narrative:      [place] guidelines for Chronic Kidney Disease (CKD):     Stage 1 with normal or high GFR (GFR > 90 mL/min/1 73 square meters)    Stage 2 Mild CKD (GFR = 60-89 mL/min/1 73 square meters)    Stage 3A Moderate CKD (GFR = 45-59 mL/min/1 73 square meters)    Stage 3B Moderate CKD (GFR = 30-44 mL/min/1 73 square meters)    Stage 4 Severe CKD (GFR = 15-29 mL/min/1 73 square meters)    Stage 5 End Stage CKD (GFR <15 mL/min/1 73 square meters)  Note: GFR calculation is accurate only with a steady state creatinine                 US pelvis complete w transvaginal   Final Result by Hermelinda Segal MD (03/17 1232)       1  Status post hysterectomy approximately one month ago   2  Mildly complex right ovarian cyst measuring 4 2 x 2 1 x 3 2 cm   3   Hematoma identified in the region of the vaginal cuff and extending toward the left adnexa, likely obscuring visualization of the left ovary, with size of 6 8 x 4 1 x 4 9 cm    4   There is a small amount of free fluid within the pelvis  5   Follow-up ultrasound is recommended for reassessment of this complex left vaginal cuff /left adnexal mass and mildly complex right ovarian cyst, and approximately 3 months time       The examination demonstrates a significant  finding and was documented as such in [facility] for liaison and referring practitioner immediate notification  The examination demonstrates a finding requiring imaging follow-up and was logged as such in [address] [place] [address]  Workstation performed: [id number]                    Procedures  Procedures         ED Course  ED Course as of Mar 17 2128   Wed Mar 17, 2021   1107 Sent by Dr Dennis Moses office  Will consult OBGYN        1140 I spoke with Dr Indra Urbina  She is requesting pelvic ultrasound  I discussed this with the patient who is agreeable  1149 Hemoglobin was 8 0 2 weeks ago  Hemoglobin(!): 8 4   1237 Dr Indra Urbina is reviewing results from today and then will evaluate the patient  Patient updated  1250 HCT(!): 27 8   1314 OBGYN is at bedside  46 OBGYN evaluated the patient  They states she is good for discharge and outpatient follow-up at this time  MDM  Number of Diagnoses or Management Options  Ovarian cyst: new and requires workup  Vaginal bleeding: new and requires workup  Diagnosis management comments: Patient presents to the emergency department for evaluation of lower abdominal cramping and vaginal bleeding that started today  Patient is s/p hysterectomy on 02/23/2021  Basic labs and type and screen were ordered  I will discuss the case with OBGYN  The patient declines anything for pain at this time  OBGYN is also recommending an ultrasound at this time  Ultrasound was ordered  Labs reviewed    Patient noted to have a hemoglobin of 8 4, however this appears to be baseline for her  This is improved from her previous hemoglobin drawn 2 weeks ago  Remaining labs unremarkable  Following the ultrasound, OBGYN agreed to come evaluate the patient  They ultimately advised that the patient is appropriate for outpatient follow-up  They states she is already scheduled  I discussed all of this with the patient, and she is agreeable  She was advised to follow-up with OBGYN as already scheduled  I advised that she return to the ED if she develops any significantly worsening bleeding, particularly if she is going through more than 1 pad per hour  She acknowledged understanding  Patient is stable for discharge  Amount and/or Complexity of Data Reviewed  Clinical lab tests: ordered and reviewed  Tests in the radiology section of CPT®: ordered and reviewed  Decide to obtain previous medical records or to obtain history from someone other than the patient: yes  Review and summarize past medical records: yes  Discuss the patient with other providers: yes    Risk of Complications, Morbidity, and/or Mortality  Presenting problems: low  Diagnostic procedures: low  Management options: low    Patient Progress  Patient progress: stable      Disposition  Final diagnoses:   Vaginal bleeding   Ovarian cyst     Time reflects when diagnosis was documented in both MDM as applicable and the Disposition within this note     Time User Action Codes Description Comment    3/17/2021  1:20 PM Dennis Mcelan Add [N93 9] Vaginal bleeding     3/17/2021  1:21 PM Dennis Mclean Add [N83 209] Ovarian cyst       ED Disposition     ED Disposition Condition Date/Time Comment    Discharge Stable Wed Mar 17, 2021  1:20 PM Teja Sharp discharge to home/self care  Follow-up Information     Follow up With Specialties Details Why Contact Info Additional Information    Follow-up with OBGYN as already scheduled           [facility] Emergency Medicine  If symptoms worsen [address] [facility] [address] [facility] Emergency Department, [address], [place], [place], [address]          Discharge Medication List as of 3/17/2021  1:22 PM      CONTINUE these medications which have NOT CHANGED    Details   acetaminophen (TYLENOL) 325 mg tablet Take 2 tablets (650 mg total) by mouth every 4 (four) hours as needed for mild pain, Starting Thu 2/25/2021, No Print      clotrimazole-betamethasone (LOTRISONE) 1-0 05 % cream 2 (two) times a day Apply sparingly to affected area, Starting Th 2/27/2020, Historical Med      docusate sodium (COLACE) 100 mg capsule Take 1 capsule (100 mg total) by mouth 2 (two) times a day, Starting Thu 2/25/2021, No Print      ferrous sulfate 325 (65 Fe) mg tablet Take 325 mg by mouth daily with breakfast, Historical Med      metoprolol tartrate (LOPRESSOR) 100 mg tablet Take 100 mg by mouth, Starting Mon 1/8/2018, Historical Med      Multiple Vitamins-Minerals (MULTIVITAMIN ADULT PO) Take 1 tablet by mouth, Historical Med      nitrofurantoin (MACROBID) 100 mg capsule Take 1 capsule (100 mg total) by mouth 2 (two) times a day, Starting Mon 3/1/2021, Normal      oxyCODONE (ROXICODONE) 5 mg immediate release tablet Take 1 tablet (5 mg total) by mouth every 6 (six) hours as needed for severe pain for up to 12 dosesMax Daily Amount: 20 mg, Starting Thu 2/25/2021, Normal      pantoprazole (PROTONIX) 40 mg tablet Take 40 mg by mouth daily, Starting Thu 10/1/2020, Until Fri 10/1/2021, Historical Med           No discharge procedures on file      [address] Review     None          ED Provider  Electronically Signed by           Kathy Oden PA-C  03/17/21 6213

## 2021-03-17 NOTE — TELEPHONE ENCOUNTER
Reviewed with Dr Dwayne Ovalle, ask patient regarding IC and BM  Patient states passing bowels but not a lot  No intercourse  Patient aware per Dr Dwayne Ovalle to ER for evaluation

## 2021-03-24 ENCOUNTER — TELEPHONE (OUTPATIENT)
Dept: OBGYN CLINIC | Facility: CLINIC | Age: 41
End: 2021-03-24

## 2021-04-07 NOTE — PROGRESS NOTES
Assessment/Plan:    Normal postop check status post CHICHO-BS  Good recovery to date   Released for normal activity   Return to office in 1 year earlier as needed       Problem List Items Addressed This Visit     None      Visit Diagnoses     Aftercare following surgery of the genitourinary system    -  Primary    S/P CHICHO (total abdominal hysterectomy)                Subjective:      Patient ID: Omayra Wing is a 37 y o  female  She came is here today for final postop visit  She is approximately 6 weeks out from a total abdominal hysterectomy and bilateral salpingectomy secondary fibroid uterus with menorrhagia and subsequent anemia  She is recovering well  She did have an episode of increased vaginal bleeding which was associated with a seroma/bloody collection that passed through the vaginal area  She had 1 heavier day bleeding  After that it was just spotting and she has had none of that since Sunday  She denies any fevers or chills  She has resume normal activity  No bowel or bladder complaints and no menopausal complaints  The following portions of the patient's history were reviewed and updated as appropriate: allergies, current medications, past family history, past medical history, past social history, past surgical history and problem list     Review of Systems   Constitutional: Negative for chills, fatigue, fever and unexpected weight change  HENT: Negative for dental problem, sinus pressure and sinus pain  Eyes: Negative for visual disturbance  Respiratory: Negative for cough, shortness of breath and wheezing  Cardiovascular: Negative for chest pain and leg swelling  Gastrointestinal: Negative for constipation, diarrhea, nausea and vomiting  Genitourinary: Negative for urgency  Musculoskeletal: Negative for back pain and joint swelling  Allergic/Immunologic: Negative for environmental allergies  Neurological: Negative for dizziness and headaches  Psychiatric/Behavioral: The patient is not nervous/anxious  Objective: There were no vitals taken for this visit  Physical Exam  Constitutional:       General: She is not in acute distress  Appearance: Normal appearance  She is normal weight  She is not ill-appearing  Comments: Young black female   HENT:      Head: Normocephalic and atraumatic  Abdominal:      General: Abdomen is flat  There is no distension  Palpations: Abdomen is soft  There is no mass  Tenderness: There is no abdominal tenderness  There is no guarding or rebound  Hernia: No hernia is present  Comments: Pfannenstiel incision well-healed   Genitourinary:     Comments: Normal female, vaginal walls are normal and vaginal cuff is intact and well healed  There is no discharge noted  Cervix is surgically absent  Uterus is surgically absent  Bimanual exam reveals no adnexal masses and the exam is completely nontender  Neurological:      General: No focal deficit present  Mental Status: She is alert and oriented to person, place, and time     Psychiatric:         Mood and Affect: Mood normal          Behavior: Behavior normal

## 2021-04-08 ENCOUNTER — OFFICE VISIT (OUTPATIENT)
Dept: OBGYN CLINIC | Facility: CLINIC | Age: 41
End: 2021-04-08

## 2021-04-08 VITALS
DIASTOLIC BLOOD PRESSURE: 88 MMHG | BODY MASS INDEX: 28 KG/M2 | WEIGHT: 164 LBS | SYSTOLIC BLOOD PRESSURE: 134 MMHG | HEIGHT: 64 IN

## 2021-04-08 DIAGNOSIS — Z90.710 S/P TAH (TOTAL ABDOMINAL HYSTERECTOMY): ICD-10-CM

## 2021-04-08 DIAGNOSIS — Z48.816 AFTERCARE FOLLOWING SURGERY OF THE GENITOURINARY SYSTEM: Primary | ICD-10-CM

## 2021-04-08 PROBLEM — N92.0 MENORRHAGIA WITH REGULAR CYCLE: Status: RESOLVED | Noted: 2019-08-21 | Resolved: 2021-04-08

## 2021-04-08 PROCEDURE — 99024 POSTOP FOLLOW-UP VISIT: CPT | Performed by: OBSTETRICS & GYNECOLOGY

## 2021-05-10 PROBLEM — Z48.816 AFTERCARE FOLLOWING SURGERY OF THE GENITOURINARY SYSTEM: Status: ACTIVE | Noted: 2021-05-10

## 2021-05-10 PROBLEM — Z90.710 S/P TAH (TOTAL ABDOMINAL HYSTERECTOMY): Status: ACTIVE | Noted: 2021-05-10

## 2022-07-28 ENCOUNTER — OFFICE VISIT (OUTPATIENT)
Dept: UROLOGY | Facility: AMBULATORY SURGERY CENTER | Age: 42
End: 2022-07-28
Payer: COMMERCIAL

## 2022-07-28 VITALS
WEIGHT: 179 LBS | HEIGHT: 63 IN | SYSTOLIC BLOOD PRESSURE: 132 MMHG | BODY MASS INDEX: 31.71 KG/M2 | DIASTOLIC BLOOD PRESSURE: 80 MMHG | HEART RATE: 70 BPM

## 2022-07-28 DIAGNOSIS — N39.0 RECURRENT UTI: Primary | ICD-10-CM

## 2022-07-28 LAB
BACTERIA UR QL AUTO: ABNORMAL /HPF
BILIRUB UR QL STRIP: NEGATIVE
CLARITY UR: CLEAR
COLOR UR: YELLOW
GLUCOSE UR STRIP-MCNC: NEGATIVE MG/DL
HGB UR QL STRIP.AUTO: ABNORMAL
KETONES UR STRIP-MCNC: NEGATIVE MG/DL
LEUKOCYTE ESTERASE UR QL STRIP: NEGATIVE
MUCOUS THREADS UR QL AUTO: ABNORMAL
NITRITE UR QL STRIP: NEGATIVE
NON-SQ EPI CELLS URNS QL MICRO: ABNORMAL /HPF
PH UR STRIP.AUTO: 6 [PH]
PROT UR STRIP-MCNC: ABNORMAL MG/DL
RBC #/AREA URNS AUTO: ABNORMAL /HPF
SL AMB  POCT GLUCOSE, UA: NORMAL
SL AMB LEUKOCYTE ESTERASE,UA: NORMAL
SL AMB POCT BILIRUBIN,UA: NORMAL
SL AMB POCT BLOOD,UA: NORMAL
SL AMB POCT CLARITY,UA: CLEAR
SL AMB POCT COLOR,UA: YELLOW
SL AMB POCT KETONES,UA: NORMAL
SL AMB POCT NITRITE,UA: NORMAL
SL AMB POCT PH,UA: 5
SL AMB POCT SPECIFIC GRAVITY,UA: 1.01
SL AMB POCT URINE PROTEIN: NORMAL
SL AMB POCT UROBILINOGEN: 0.2
SP GR UR STRIP.AUTO: 1.03 (ref 1–1.03)
UROBILINOGEN UR STRIP-ACNC: <2 MG/DL
WBC #/AREA URNS AUTO: ABNORMAL /HPF

## 2022-07-28 PROCEDURE — 87086 URINE CULTURE/COLONY COUNT: CPT | Performed by: NURSE PRACTITIONER

## 2022-07-28 PROCEDURE — 81002 URINALYSIS NONAUTO W/O SCOPE: CPT | Performed by: NURSE PRACTITIONER

## 2022-07-28 PROCEDURE — 81001 URINALYSIS AUTO W/SCOPE: CPT | Performed by: NURSE PRACTITIONER

## 2022-07-28 PROCEDURE — 99204 OFFICE O/P NEW MOD 45 MIN: CPT | Performed by: NURSE PRACTITIONER

## 2022-07-28 RX ORDER — NITROFURANTOIN 25; 75 MG/1; MG/1
CAPSULE ORAL
COMMUNITY
Start: 2022-06-30 | End: 2022-09-29 | Stop reason: ALTCHOICE

## 2022-07-28 RX ORDER — MIRTAZAPINE 7.5 MG/1
TABLET, FILM COATED ORAL
COMMUNITY
Start: 2022-07-13

## 2022-07-28 RX ORDER — CLOTRIMAZOLE 1 %
CREAM (GRAM) TOPICAL
COMMUNITY
Start: 2022-04-27 | End: 2022-09-29 | Stop reason: ALTCHOICE

## 2022-07-28 NOTE — PATIENT INSTRUCTIONS
BLADDER HEALTH    WHAT IS CONSIDERED NORMAL? The average bladder can hold about 2 cups of urine before it needs to be emptied  The normal range of voiding urine is 6 to 8 times during a 24 hour period  As we get older, our bladder capacity can get smaller and we may need to pass urine more frequently but usually not more than every 2 hours  Urine should flow easily without discomfort in a good, steady stream until the bladder is empty  No pushing or straining is necessary to empty the bladder  An urge is a signal that you feel as the bladder stretches to fill with urine  Urges can be felt even if the bladder is not full  Urges are not commands to go to the toilet, merely a signal and can be controlled  WHAT ARE GOOD BLADDER HABITS? Take your time when emptying your bladder  Dont strain or push to empty your bladder  Make sure you empty your bladder completely each time you pass urine  Do not rush the process  Consistently ignoring the urge to go (waiting more than 4 hours between toileting) or urinating too infrequently may be convenient but not healthy for your bladder  Avoid going to the toilet just in case or more often than every 2 hours  It is usually not necessary to go when you feel the first urge  Try to go only when your bladder is full  Urgency and frequency of urination can be improved by retraining the bladder and spacing your fluid intake throughout the day  Practice good toilet habits  Dont let your bladder control your life  TIPS TO MAINTAIN GOOD BLADDER HABITS  Maintain a good fluid intake  Depending on your body size and environment, drink 6 -8 cups (8 oz each) of fluid per day unless otherwise advised by your doctor  Not enough fluid creates a foul odor and dark color of the urine  Limit the amount of caffeine (coffee, cola, chocolate or tea) and citrus foods that you consume as these foods can be associated with increased sensation of urinary urgency and frequency  Limit the amount of alcohol you drink  Alcohol increases urine production and makes it difficult for the brain to coordinate bladder control  Avoid constipation by maintaining a balanced diet of dietary fiber  Cigarette smoking is also irritating to the bladder surface and is associated with bladder cancer  In addition, the coughing associated with smoking may lead to increased incontinent episodes because of the increased pressure  HOW DIET CAN AFFECT YOUR BLADDER  Although there is no particular "diet" that can cure bladder control, there are certain dietary suggestions you can use to help control the problem  There are 2 points to consider when evaluating how your habits and diet may affect your bladder:    Foods and fluids can irritate the bladder  Some foods and beverages are thought to contribute to bladder leakage and irritability  However their effect on the bladder is not completely understood and you may want to see if eliminating one or all of these items improves your bladder control  If you are unable to give them up completely, it is recommended that you use the following items in moderation:  Acidic beverages and foods (orange juice, grapefruit juice, lemonade etc)  Alcoholic beverages  Vinegar  Coffee (regular and decaf)  Tea (regular and decaf)  Caffeinated beverages  Carbonated beverages          Drinking enough and the right kinds of fluids  Many people with bladder control issues decrease their intake of liquids in hope that they will need to urinate less frequently or have less urinary leakage  You should not restrict fluids to control your bladder  While a decrease in liquid intake does result in a decrease in the volume of urine, the smaller amount of urine may be more highly concentrated  Highly concentrated, dark yellow urine is irritating to the bladder surface and may actually cause you to go to the bathroom more frequently        It also encourages the growth of bacteria, which may lead to infections resulting in incontinence  Substitutions for Bladder Irritants: water is always the best beverage choice  Grape and apple juice are thirst quenchers are good selections and are not as irritating to the bladder    Low acid fruits:  Pears, apricots, papaya, watermelon  For coffee drinkers: KAVA®, Postum®, Alexandr®, Kaffree Jocelyn®  For tea drinkers:  non-citrus or herbal and sun brewed tea

## 2022-07-28 NOTE — PROGRESS NOTES
7/28/2022    Darron Mcnamara  1980  [id number]        Assessment  -Recurrent urinary tract infection     Discussion/Plan  Josy Arciniega is a 40 y o  female who presents in consultation    1  Recurrent urinary tract infections- urine dip in the office today appears negative for infection, trace blood noted  We will send for urinalysis with microscopic and culture  Recommend obtaining a renal ultrasound to rule out any anatomical cause of her symptoms  Would recommend treating her urinary tract infections on as-needed basis  We did discuss that primary symptom of urethral discomfort may be secondary to hysterectomy as symptoms began shortly after surgery  Would recommend continued follow-up gyn  Encouraged patient to increase her water intake  Provided patient with standing order for urine culture  Call with results of urine testing and renal ultrasound  We will determine follow-up thereafter  She was advised to call with any questions or issues     -All questions answered, patients agree with plan     History of Present Illness  40 y o  female who presents in consultation today for evaluation of recurrent urinary tract infections  She was referred by her PCP  Patient states she has been experiencing symptoms of recurrent urinary tract infection over the last 1 year  Presumed infections have been treated by her PCP  She states symptoms include urethral discomfort as well as odorous urine  She denies any additional lower urinary tract symptoms, gross hematuria, dysuria, or incontinence  Patient does admit that she does not hydrate enough with water  She underwent partial hysterectomy last year  No prior urine cultures available for review  She is typically prescribed Macrobid  Patient states she was last prescribed an antibiotic 3 weeks ago  Patient states symptoms do not correlate with sexual activity  She reports a prior history of urinary tract infections as a child    Patient denies any additional urologic history, surgical intervention, or instrumentation  She has had 2 vaginal births and 1 C section  Review of Systems  Review of Systems   Constitutional: Negative  HENT: Negative  Respiratory: Negative  Cardiovascular: Negative  Gastrointestinal: Negative  Genitourinary: Negative for decreased urine volume, difficulty urinating, dysuria, flank pain, frequency, hematuria, pelvic pain, urgency, vaginal bleeding, vaginal discharge and vaginal pain  Musculoskeletal: Negative  Skin: Negative  Neurological: Negative  Psychiatric/Behavioral: Negative          Past Medical History  Past Medical History:   Diagnosis Date    Abnormal Pap smear of cervix         Anemia     Depression     Fibroid     GERD (gastroesophageal reflux disease)     Hypertension     Iron deficiency anemia        Past Social History  Past Surgical History:   Procedure Laterality Date    BARIATRIC SURGERY  2018     SECTION      CHOLECYSTECTOMY      COLPOSCOPY      GASTRIC RESTRICTION SURGERY      PA TOTAL ABDOM HYSTERECTOMY N/A 2021    Procedure: HYSTERECTOMY TOTAL ABDOMINAL (CHICHO) BILATERAL SALPINGECTOMY;  Surgeon: Sherita Malagon MD;  Location: [facility] OR;  Service: Gynecology       Past Family History  Family History   Problem Relation Age of Onset    Diabetes Mother         Borderline    Hypertension Mother     Thyroid disease Mother     Heart disease Father     Heart failure Father         [de-identified] away   [facility] Diabetes Sister     Hypertension Sister     Heart disease Paternal Grandmother     Heart failure Maternal Grandmother         Passed away    Heart disease Maternal Grandmother     Hypertension Sister     Hypertension Brother        Past Social history  Social History     Socioeconomic History    Marital status: Single     Spouse name: Not on file    Number of children: Not on file    Years of education: Not on file    Highest education level: Not on file   Occupational History    Not on file   Tobacco Use    Smoking status: Never Smoker    Smokeless tobacco: Never Used   Vaping Use    Vaping Use: Never used   Substance and Sexual Activity    Alcohol use: Not Currently     Comment: occ    Drug use: Never    Sexual activity: Yes     Partners: Male     Birth control/protection: Patch   Other Topics Concern    Not on file   Social History Narrative    Not on file     Social Determinants of Health     Financial Resource Strain: Not on file   Food Insecurity: Not on file   Transportation Needs: Not on file   Physical Activity: Not on file   Stress: Not on file   Social Connections: Not on file   Intimate Partner Violence: Not on file   Housing Stability: Not on file       Current Medications  Current Outpatient Medications   Medication Sig Dispense Refill    acetaminophen (TYLENOL) 325 mg tablet Take 2 tablets (650 mg total) by mouth every 4 (four) hours as needed for mild pain  0    clotrimazole-betamethasone (LOTRISONE) 1-0 05 % cream 2 (two) times a day Apply sparingly to affected area      docusate sodium (COLACE) 100 mg capsule Take 1 capsule (100 mg total) by mouth 2 (two) times a day 10 capsule 0    ferrous sulfate 325 (65 Fe) mg tablet Take 325 mg by mouth daily with breakfast      metoprolol tartrate (LOPRESSOR) 100 mg tablet Take 100 mg by mouth      Multiple Vitamins-Minerals (MULTIVITAMIN ADULT PO) Take 1 tablet by mouth       No current facility-administered medications for this visit  Allergies  No Known Allergies    Past medical history, social history, family history, medications and allergies were reviewed  Vitals  There were no vitals filed for this visit  Physical Exam  Physical Exam  Constitutional:       Appearance: Normal appearance  She is well-developed  HENT:      Head: Normocephalic  Eyes:      Pupils: Pupils are equal, round, and reactive to light     Pulmonary:      Effort: Pulmonary effort is normal  Abdominal:      Palpations: Abdomen is soft  Tenderness: There is no right CVA tenderness or left CVA tenderness  Musculoskeletal:         General: Normal range of motion  Cervical back: Normal range of motion  Skin:     General: Skin is warm and dry  Neurological:      General: No focal deficit present  Mental Status: She is alert and oriented to person, place, and time  Psychiatric:         Mood and Affect: Mood normal          Behavior: Behavior normal          Thought Content: Thought content normal          Judgment: Judgment normal          Results    I have personally reviewed all pertinent lab results and reviewed with patient  Lab Results   Component Value Date    GLUCOSE 82 08/12/2015    CALCIUM 8 8 03/17/2021     08/12/2015    K 4 8 03/17/2021    CO2 28 03/17/2021     03/17/2021    BUN 6 03/17/2021    CREATININE 0 71 03/17/2021     Lab Results   Component Value Date    WBC 5 42 03/17/2021    HGB 8 4 (L) 03/17/2021    HCT 27 8 (L) 03/17/2021    MCV 97 03/17/2021     (H) 03/17/2021     No results found for this or any previous visit (from the past 1 hour(s))

## 2022-07-29 ENCOUNTER — HOSPITAL ENCOUNTER (OUTPATIENT)
Dept: RADIOLOGY | Facility: HOSPITAL | Age: 42
Discharge: HOME | End: 2022-07-29
Payer: COMMERCIAL

## 2022-07-29 DIAGNOSIS — N39.0 RECURRENT UTI: ICD-10-CM

## 2022-07-29 PROCEDURE — 76770 US EXAM ABDO BACK WALL COMP: CPT

## 2022-07-30 LAB — BACTERIA UR CULT: NORMAL

## 2022-08-22 ENCOUNTER — CONSULT (OUTPATIENT)
Dept: GASTROENTEROLOGY | Facility: CLINIC | Age: 42
End: 2022-08-22
Payer: COMMERCIAL

## 2022-08-22 VITALS
BODY MASS INDEX: 31.18 KG/M2 | DIASTOLIC BLOOD PRESSURE: 97 MMHG | HEIGHT: 63 IN | HEART RATE: 77 BPM | TEMPERATURE: 98.6 F | SYSTOLIC BLOOD PRESSURE: 137 MMHG | WEIGHT: 176 LBS

## 2022-08-22 DIAGNOSIS — K58.0 IRRITABLE BOWEL SYNDROME WITH DIARRHEA: Primary | ICD-10-CM

## 2022-08-22 DIAGNOSIS — R10.32 LEFT LOWER QUADRANT ABDOMINAL PAIN: ICD-10-CM

## 2022-08-22 DIAGNOSIS — R14.0 BLOATING: ICD-10-CM

## 2022-08-22 PROCEDURE — 99203 OFFICE O/P NEW LOW 30 MIN: CPT | Performed by: PHYSICIAN ASSISTANT

## 2022-08-22 NOTE — PROGRESS NOTES
[facility] - Outpatient Consultation  Domo Lowe 40 y o  female MRN: [id number]  Encounter: [id number]          ASSESSMENT AND PLAN:      1  Irritable Bowel Syndrome with diarrhea  2  Bloating, secondary to #1  3  Abdominal pain, secondary to #1    Patient presents to establish care for the evaluation and management of irritable bowel syndrome  She was evaluated with normal EGD and colonoscopy in 2013 and symptoms are unchanged since  We will start with the addition of a daily probiotic and the slow introduction of fiber, either through dietary fiber or a fiber supplement  She should maintain adequate hydration  Discussed the low FODMAP diet and instructed to follow for 4-6 weeks before slowly re-introducing desired foods that may be higher in FODMAP content to assess for tolerance  Literature on low FODMAP diet was provided  Since she had a normal endoscopic evaluation in 2013 will hold on repeating this now  If her symptoms do not respond to conservative management, can reconsider this  Will plan to follow up in 3 months to assess her response to treatment  She was advised to call the office with new symptoms of weight loss, blood in the stool, new onset constipation or anorexia  All of the above was explained in detail - she expresses understanding and agreement with this plan     ______________________________________________________________________    HPI:  Catherine Min is a 60-year-old female s/p sleeve gastrectomy and s/p CHICHO with bilateral salpingectomy secondary to uterine fibroids who presents for evaluation and management of irritable bowel syndrome  She states she was seen for these symptoms in 2013 and underwent EGD and colonoscopy which were normal studies  She describes post-prandial urgency with bowel movements, loose, non-bloody stools and associated abdominal cramping, predominantly in the LLQ    She also reports frequent bloating and abdominal distention - she feels that there is "gas trapping" and she cannot get gas to pass  When she is able to have a bowel movement or flatulence, her abdominal pain symptoms improve  These symptoms occur daily and are unchanged since   She has tried a low-lactose diet without significant improvement  She denies melena or hematochezia  Her weight is stable  She is known to have anemia secondary to heavy periods  Her last Hgb was 11 2g/dL in 2022, prior to her hysterectomy  She has not had post-operative labs yet but is due to re-assess next month  There is no family history of colon cancer  REVIEW OF SYSTEMS:    CONSTITUTIONAL: Denies any fever, chills, rigors, and weight loss  HEENT: No earache or tinnitus  Denies hearing loss or visual disturbances  CARDIOVASCULAR: No chest pain or palpitations  RESPIRATORY: Denies any cough, hemoptysis, shortness of breath or dyspnea on exertion  GASTROINTESTINAL: As noted in the History of Present Illness  GENITOURINARY: has a history of frequent UTIs  Denies any hematuria or dysuria  Follows with urology  NEUROLOGIC: No dizziness or vertigo, denies headaches  MUSCULOSKELETAL: Denies any muscle or joint pain  SKIN: Denies skin rashes or itching  ENDOCRINE: Denies excessive thirst  Denies intolerance to heat or cold  PSYCHOSOCIAL: Denies depression or anxiety  Denies any recent memory loss         Historical Information   Past Medical History:   Diagnosis Date    Abnormal Pap smear of cervix         Anemia     Depression     Fibroid     GERD (gastroesophageal reflux disease)     Hypertension     Iron deficiency anemia      Past Surgical History:   Procedure Laterality Date    BARIATRIC SURGERY  2018     SECTION      CHOLECYSTECTOMY      COLPOSCOPY      GASTRIC RESTRICTION SURGERY      NC TOTAL ABDOM HYSTERECTOMY N/A 2021    Procedure: HYSTERECTOMY TOTAL ABDOMINAL (CHICHO) BILATERAL SALPINGECTOMY;  Surgeon: Sunitha Esparza MD; Location: [facility];  Service: Gynecology     Social History   Social History     Substance and Sexual Activity   Alcohol Use Not Currently    Comment: occ     Social History     Substance and Sexual Activity   Drug Use Never     Social History     Tobacco Use   Smoking Status Never Smoker   Smokeless Tobacco Never Used     Family History   Problem Relation Age of Onset    Diabetes Mother         Borderline    Hypertension Mother     Thyroid disease Mother     Heart disease Father     Heart failure Father         Passed away   Kirit Abt Diabetes Sister     Hypertension Sister     Heart disease Paternal Grandmother     Heart failure Maternal Grandmother         Passed away    Heart disease Maternal Grandmother     Hypertension Sister     Hypertension Brother        Meds/Allergies       Current Outpatient Medications:     clotrimazole (LOTRIMIN) 1 % cream    clotrimazole-betamethasone (LOTRISONE) 1-0 05 % cream    ferrous sulfate 325 (65 Fe) mg tablet    metoprolol tartrate (LOPRESSOR) 100 mg tablet    mirtazapine (REMERON) 7 5 MG tablet    nitrofurantoin (MACROBID) 100 mg capsule    acetaminophen (TYLENOL) 325 mg tablet    docusate sodium (COLACE) 100 mg capsule    Multiple Vitamins-Minerals (MULTIVITAMIN ADULT PO)    No Known Allergies        Objective     Blood pressure 137/97, pulse 77, temperature 98 6 °F (37 °C), temperature source Tympanic, height 5' 3" (1 6 m), weight 79 8 kg (176 lb), unknown if currently breastfeeding  Body mass index is 31 18 kg/m²          PHYSICAL EXAM:      General Appearance:   Alert, cooperative, no distress   HEENT:   Normocephalic, atraumatic, anicteric      Neck:  Supple, symmetrical, trachea midline   Lungs:   Respirations unlabored    Heart[de-identified]   Regular rate and rhythm   Abdomen:   Non-distended   Genitalia:   Deferred    Rectal:   Deferred    Extremities:  No cyanosis, clubbing or edema    Pulses:  2+ and symmetric    Skin:  No jaundice, rashes, or lesions  Hodan Montes PA-C    Answers for HPI/ROS submitted by the patient on 8/21/2022  Chronicity: chronic  Onset: more than 1 year ago  Onset quality: gradual  Frequency: constantly  Episode duration: 24 hours  Progression since onset: waxing and waning  Pain location: epigastric region  Pain - numeric: 8/10  Pain quality: cramping  Radiates to: pelvis  anorexia: Yes  arthralgias: Yes  belching: No  constipation: Yes  diarrhea: Yes  dysuria: No  fever: No  flatus: Yes  frequency: No  headaches: No  hematochezia: No  hematuria: No  melena: No  myalgias: Yes  nausea: Yes  weight loss: No  vomiting: No  Aggravated by: eating  Relieved by: bowel movements, passing flatus, recumbency

## 2022-09-29 ENCOUNTER — OFFICE VISIT (OUTPATIENT)
Dept: OBGYN CLINIC | Facility: CLINIC | Age: 42
End: 2022-09-29
Payer: COMMERCIAL

## 2022-09-29 VITALS
DIASTOLIC BLOOD PRESSURE: 78 MMHG | SYSTOLIC BLOOD PRESSURE: 130 MMHG | WEIGHT: 175 LBS | HEIGHT: 63 IN | BODY MASS INDEX: 31.01 KG/M2

## 2022-09-29 DIAGNOSIS — N83.201 RIGHT OVARIAN CYST: ICD-10-CM

## 2022-09-29 DIAGNOSIS — Z78.0 MENOPAUSE: Primary | ICD-10-CM

## 2022-09-29 DIAGNOSIS — N89.8 VAGINAL DRYNESS: ICD-10-CM

## 2022-09-29 PROCEDURE — 99213 OFFICE O/P EST LOW 20 MIN: CPT | Performed by: STUDENT IN AN ORGANIZED HEALTH CARE EDUCATION/TRAINING PROGRAM

## 2022-09-29 RX ORDER — CONJUGATED ESTROGENS 0.62 MG/G
0.5 CREAM VAGINAL DAILY
Qty: 30 G | Refills: 1 | Status: SHIPPED | OUTPATIENT
Start: 2022-09-29

## 2022-09-29 RX ORDER — PANTOPRAZOLE SODIUM 40 MG/1
TABLET, DELAYED RELEASE ORAL
COMMUNITY
Start: 2022-09-26

## 2022-09-29 NOTE — PATIENT INSTRUCTIONS
Bonafide--vaginal suppositories AND menopause relief  Foria--CBD based sex-focused lubricants and suppositories  Vitamin D oil  Premarin (vaginal estrogen)  Scream Cream    Menopause    Menopause requires no medical treatment  Instead, treatments focus on relieving your signs and symptoms and preventing or managing chronic conditions that may occur with aging  Treatments may include:  Hormone therapy  Estrogen therapy is the most effective treatment option for relieving menopausal hot flashes  Depending on your personal and family medical history, your doctor may recommend estrogen in the lowest dose and the shortest time frame needed to provide symptom relief for you  If you still have your uterus, you'll need progestin in addition to estrogen  Estrogen also helps prevent bone loss  Long-term use of hormone therapy may have some cardiovascular and breast cancer risks, but starting hormones around the time of menopause has shown benefits for some women  Talk to your doctor about the benefits and risks of hormone therapy and whether it's a safe choice for you  Vaginal estrogen  To relieve vaginal dryness, estrogen can be administered directly to the vagina using a vaginal cream, tablet or ring  This treatment releases just a small amount of estrogen, which is absorbed by the vaginal tissues  It can help relieve vaginal dryness, discomfort with intercourse and some urinary symptoms  Low-dose antidepressants  Certain antidepressants related to the class of drugs called selective serotonin reuptake inhibitors (SSRIs) may decrease menopausal hot flashes  A low-dose antidepressant for management of hot flashes may be useful for women who can't take estrogen for health reasons or for women who need an antidepressant for a mood disorder  Gabapentin (Gralise, Horizant, Neurontin)  Gabapentin is approved to treat seizures, but it has also been shown to help reduce hot flashes   This drug is useful in women who can't use estrogen therapy and in those who also have nighttime hot flashes  Clonidine (Catapres, Kapvay)  Clonidine, a pill or patch typically used to treat high blood pressure, might provide some relief from hot flashes  Medications to prevent or treat osteoporosis  Depending on individual needs, doctors may recommend medication to prevent or treat osteoporosis  Several medications are available that help reduce bone loss and risk of fractures  Your doctor might prescribe vitamin D supplements to help strengthen bones  Before deciding on any form of treatment, talk with your doctor about your options and the risks and benefits involved with each  Review your options yearly, as your needs and treatment options may change  Lifestyle and home remedies  Fortunately, many of the signs and symptoms associated with menopause are temporary  Take these steps to help reduce or prevent their effects:  Cool hot flashes  Dress in layers, have a cold glass of water or go somewhere cooler  Try to pinpoint what triggers your hot flashes  For many women, triggers may include hot beverages, caffeine, spicy foods, alcohol, stress, hot weather and even a warm room  Decrease vaginal discomfort  Try an over-the-counter, water-based vaginal lubricant (Astroglide, K-Y jelly, Sliquid, others) or a silicone-based lubricant or moisturizer (Replens, K-Y Liquibeads, Sliquid, others)  You might consider choosing a product that doesn't contain glycerin, which can cause burning or irritation if you're sensitive to that chemical  Staying sexually active also helps with vaginal discomfort by increasing blood flow to the vagina  Get enough sleep  Avoid caffeine, which can make it hard to get to sleep, and avoid drinking too much alcohol, which can interrupt sleep  Exercise during the day, although not right before bedtime   If hot flashes disturb your sleep, you may need to find a way to manage them before you can get adequate rest    Practice relaxation techniques  Techniques such as deep breathing, paced breathing, guided imagery, massage and progressive muscle relaxation may help with menopausal symptoms  You can find a number of books and online offerings that show different relaxation exercises  Strengthen your pelvic floor  Pelvic floor muscle exercises, called Kegel exercises, can improve some forms of urinary incontinence  Eat a balanced diet  Include a variety of fruits, vegetables and whole grains  Limit saturated fats, oils and sugars  Ask your provider if you need calcium or vitamin D supplements to help meet daily requirements  Don't smoke  Smoking increases your risk of heart disease, stroke, osteoporosis, cancer and a range of other health problems  It may also increase hot flashes and bring on earlier menopause  Exercise regularly  Get regular physical activity or exercise on most days to help protect against heart disease, diabetes, osteoporosis and other conditions associated with aging  Alternative medicine  Many approaches have been promoted as aids in managing the symptoms of menopause, but few of them have scientific evidence to back up the claims  Some complementary and alternative treatments that have been or are being studied include:  Plant estrogens (phytoestrogens)  These estrogens occur naturally in certain foods  There are two main types of phytoestrogens -- isoflavones and lignans  Isoflavones are found in soybeans, lentils, chickpeas and other legumes  Lignans occur in flaxseed, whole grains, and some fruits and vegetables  Whether the estrogens in these foods can relieve hot flashes and other menopausal symptoms remains to be proved, but most studies have found them ineffective  Isoflavones have some weak estrogen-like effects, so if you've had breast cancer, talk to your doctor before supplementing your diet with isoflavone pills    The herb vy is thought to contain compounds with estrogen-like effects, and there's good evidence that it can effectively manage menopause symptoms  The herb and its oils should be avoided in people who have an allergy to vy, and in pregnant or breast-feeding women  Use carefully in people with high blood pressure or epilepsy  Bioidentical hormones  These hormones come from plant sources  The term "bioidentical" implies the hormones in the product are chemically identical to those your body produces  There are some commercially available bioidentical hormones approved by the Food and Drug Administration (FDA)  But many preparations are compounded -- mixed in a pharmacy according to a doctor's prescription -- and aren't regulated by the FDA, so quality and risks could vary  There's no scientific evidence that bioidentical hormones work any better than traditional hormone therapy in easing menopause symptoms  There's also no evidence that they're any less risky than traditional hormone therapy  Black cohosh  Black cohosh has been popular among many women with menopausal symptoms  But there's little evidence that black cohosh is effective, and the supplement can be harmful to the liver and may be unsafe for women with a history of breast cancer  Yoga  There's no evidence to support the practice of yoga in reducing menopausal symptoms  But balance exercises such as yoga or scot chi can improve strength and coordination and may help prevent falls that could lead to broken bones  Check with your doctor before starting balance exercises  Consider taking a class to learn how to perform postures and proper breathing techniques  Acupuncture  Acupuncture may have some temporary benefit in helping to reduce hot flashes, but research hasn't shown significant or consistent improvements  More research is needed  Hypnosis   Hypnotherapy may decrease the incidence of hot flashes for some menopausal women, according to research from the [facility] and [facility] at the [address]  Hypnotherapy also helped improve sleep and decreased interference in daily life, according to the study  You may have heard of or tried other dietary supplements, such as red clover, kava, dong quai, DHEA, evening primrose oil and wild yam (natural progesterone cream)  Scientific evidence on effectiveness is lacking, and some of these products may be harmful  Talk with your doctor before taking any herbal or dietary supplements for menopausal symptoms  The FDA does not regulate herbal products, and some can be dangerous or interact with other medications you take, putting your health at risk

## 2022-09-29 NOTE — PROGRESS NOTES
Assessment/Plan:  Jarred Grier is a 40 y o  [id number] ws/p CHICHO () who presents for menopausal symptoms    1  Menopause  FSH and LH   2  Vaginal dryness  estrogens, conjugated (Premarin) vaginal cream   3  Right ovarian cyst  US pelvis complete w transvaginal      Reviewed spectrum of treatment for menopausal symptoms including but not limited to: SSRIs, gabapentin (for night sweats/hot flashes, but wont improve pelvic/vaginal symptoms), vaginal estrogen (wont address hot flashes/night sweats), and HRT (addresses all symptoms, but associated with risk of CVD, DVT/VTE--she is high risk because of CHTN and would not recommend)  reviewed some evidence to support use of acupuncture and natural supplements like Estroven and Bonafide supplements  · For vaginal dryness, discomfort with sex and decreased arousal reviewed premarin, Bonafide suppositories, Foria kits, vitamin D suppositories, and scream cream   · Will try vaginal estrogen and consider other options  · For right ovarian cyst seen in , recommend repeat ultrasound to ensure resolution/stability  · RTO in 2-3 months PRN for follow-up    Subjective:      Patient ID: Jarred Grier is a 40 y o  female  HPI  S/p CHICHO ()  Vaginal driness  Dyspareunia with insertion, deep penetration, all the time  Uses Astrolube (thicker consistency than liquify one, coconut oil made her drier)  Hot flashes--many 3 times per month  Mood swings    Some insomnia  No night sweats    No hs of breast cancer  +chtn on metoprolol  No DVT/VTE    The following portions of the patient's history were reviewed and updated as appropriate:   She  has a past medical history of Abnormal Pap smear of cervix, Anemia, Depression, Fibroid, GERD (gastroesophageal reflux disease), Hypertension, and Iron deficiency anemia  She  has a past surgical history that includes Cholecystectomy;  section; Gastric restriction surgery; Colposcopy ();  Bariatric Surgery (2018); and pr total abdom hysterectomy (N/A, 2/23/2021)  Current Outpatient Medications on File Prior to Visit   Medication Sig    clotrimazole-betamethasone (LOTRISONE) 1-0 05 % cream 2 (two) times a day Apply sparingly to affected area    ferrous sulfate 325 (65 Fe) mg tablet Take 325 mg by mouth daily with breakfast    metoprolol tartrate (LOPRESSOR) 100 mg tablet Take 100 mg by mouth    mirtazapine (REMERON) 7 5 MG tablet TAKE 1 TABLET (7 5 MG) BY ORAL ROUTE ONCE DAILY AT BEDTIME    pantoprazole (PROTONIX) 40 mg tablet     [DISCONTINUED] acetaminophen (TYLENOL) 325 mg tablet Take 2 tablets (650 mg total) by mouth every 4 (four) hours as needed for mild pain    [DISCONTINUED] clotrimazole (LOTRIMIN) 1 % cream APPLY TO AFFECTED AREA TWICE A DAY IN THE MORNING AND IN THE EVENING (Patient not taking: Reported on 9/29/2022)    [DISCONTINUED] docusate sodium (COLACE) 100 mg capsule Take 1 capsule (100 mg total) by mouth 2 (two) times a day    [DISCONTINUED] Multiple Vitamins-Minerals (MULTIVITAMIN ADULT PO) Take 1 tablet by mouth    [DISCONTINUED] nitrofurantoin (MACROBID) 100 mg capsule TAKE 1 CAPSULE BY MOUTH TWICE A DAY WITH FOOD FOR 5 DAYS (Patient not taking: Reported on 9/29/2022)     No current facility-administered medications on file prior to visit  She has No Known Allergies       Review of Systems   Gastrointestinal: Negative for abdominal pain  Genitourinary: Positive for dyspareunia  Negative for dysuria, pelvic pain, vaginal bleeding and vaginal discharge  Objective:  /78 (BP Location: Left arm, Patient Position: Sitting, Cuff Size: Standard)   Ht 5' 3" (1 6 m)   Wt 79 4 kg (175 lb)   LMP 12/11/2020 (Exact Date)   BMI 31 00 kg/m²      Physical Exam  Constitutional:       Appearance: Normal appearance  HENT:      Head: Normocephalic  Eyes:      Extraocular Movements: Extraocular movements intact        Conjunctiva/sclera: Conjunctivae normal    Pulmonary:      Effort: Pulmonary effort is normal    Abdominal:      General: There is no distension  Palpations: Abdomen is soft  Tenderness: There is no abdominal tenderness  There is no guarding or rebound  Genitourinary:     Comments: Vulva: normal, no lesions  Vagina: normal, no lesions or ttp  Urethra: normal, no lesions, masses or ttp  Bladder: normal, no masses or ttp  Cervix: surgically absent  Uterus: surgically absent  Adnexa: no masses or ttp    Musculoskeletal:         General: Normal range of motion  Cervical back: Normal range of motion  Skin:     General: Skin is warm and dry  Neurological:      General: No focal deficit present  Mental Status: She is alert  Psychiatric:         Mood and Affect: Mood normal          Behavior: Behavior normal          Thought Content:  Thought content normal

## 2023-02-06 ENCOUNTER — HOSPITAL ENCOUNTER (OUTPATIENT)
Dept: RADIOLOGY | Facility: HOSPITAL | Age: 43
Discharge: HOME | End: 2023-02-06
Attending: STUDENT IN AN ORGANIZED HEALTH CARE EDUCATION/TRAINING PROGRAM

## 2023-02-06 DIAGNOSIS — N83.201 RIGHT OVARIAN CYST: ICD-10-CM

## 2023-02-08 ENCOUNTER — APPOINTMENT (OUTPATIENT)
Dept: RADIOLOGY | Facility: OTHER | Age: 43
End: 2023-02-08

## 2023-02-08 ENCOUNTER — OFFICE VISIT (OUTPATIENT)
Dept: OBGYN CLINIC | Facility: OTHER | Age: 43
End: 2023-02-08

## 2023-02-08 VITALS
HEART RATE: 63 BPM | SYSTOLIC BLOOD PRESSURE: 140 MMHG | HEIGHT: 64 IN | BODY MASS INDEX: 29.6 KG/M2 | DIASTOLIC BLOOD PRESSURE: 96 MMHG | WEIGHT: 173.4 LBS

## 2023-02-08 DIAGNOSIS — M75.41 ROTATOR CUFF IMPINGEMENT SYNDROME, RIGHT: ICD-10-CM

## 2023-02-08 DIAGNOSIS — M25.511 RIGHT SHOULDER PAIN, UNSPECIFIED CHRONICITY: ICD-10-CM

## 2023-02-08 DIAGNOSIS — M25.511 RIGHT SHOULDER PAIN, UNSPECIFIED CHRONICITY: Primary | ICD-10-CM

## 2023-02-08 RX ORDER — TRIAMCINOLONE ACETONIDE 40 MG/ML
40 INJECTION, SUSPENSION INTRA-ARTICULAR; INTRAMUSCULAR
Status: COMPLETED | OUTPATIENT
Start: 2023-02-08 | End: 2023-02-08

## 2023-02-08 RX ORDER — CLOTRIMAZOLE 1 %
CREAM (GRAM) TOPICAL
COMMUNITY
Start: 2022-10-25

## 2023-02-08 RX ORDER — LIDOCAINE HYDROCHLORIDE 20 MG/ML
4 INJECTION, SOLUTION EPIDURAL; INFILTRATION; INTRACAUDAL; PERINEURAL
Status: COMPLETED | OUTPATIENT
Start: 2023-02-08 | End: 2023-02-08

## 2023-02-08 RX ORDER — METOPROLOL SUCCINATE 100 MG/1
100 TABLET, EXTENDED RELEASE ORAL DAILY
COMMUNITY
Start: 2022-11-21

## 2023-02-08 RX ORDER — NAPROXEN SODIUM 550 MG/1
550 TABLET ORAL EVERY 12 HOURS PRN
COMMUNITY
Start: 2023-01-18

## 2023-02-08 RX ORDER — METHOCARBAMOL 750 MG/1
TABLET, FILM COATED ORAL
COMMUNITY
Start: 2023-01-13

## 2023-02-08 RX ADMIN — TRIAMCINOLONE ACETONIDE 40 MG: 40 INJECTION, SUSPENSION INTRA-ARTICULAR; INTRAMUSCULAR at 09:14

## 2023-02-08 RX ADMIN — LIDOCAINE HYDROCHLORIDE 4 ML: 20 INJECTION, SOLUTION EPIDURAL; INFILTRATION; INTRACAUDAL; PERINEURAL at 09:14

## 2023-02-08 NOTE — PROGRESS NOTES
Assessment:       1  Right shoulder pain, unspecified chronicity    2  Rotator cuff impingement syndrome, right          Plan:        Explained my current clinical findings and reviewed radiological findings with the patient today  Her clinical evaluation is consistent with right shoulder rotator cuff impingement syndrome  We reviewed management options including physical therapy, continuation of oral NSAIDs or a trial of right subacromial corticosteroid injection  Patient was agreeable to a right subacromial corticosteroid injection and this was performed on today's office visit without any immediate complications  I have also made a referral for physical therapy rehabilitation  Patient will follow-up in approximately 2 months time  If symptoms significantly persist despite these measures we will consider getting advanced imaging  Subjective:     Patient ID: Danika Pompa is a 40 y o  female  Chief Complaint: Right shoulder pain    HPI  Antonia Griffith is a 45-year-old lady who presents today for evaluation of right shoulder pain  Patient reports pain of the right lateral shoulder that radiates down her right upper extremity  Symptoms are nontraumatic in onset and have been present for approximately 2 months duration  Denies any associated significant neck pain or new onset progressive tingling or numbness of the right upper extremity  Does not have any known history of previous right shoulder surgery or dislocation  She has tried oral naproxen with only partial relief of symptoms  Patient is not a diabetic and denies any history of smoking       Patient Active Problem List   Diagnosis   • Essential hypertension   • Special screening for malignant neoplasm   • Hypokalemia   • Iron deficiency anemia due to chronic blood loss   • S/P laparoscopic sleeve gastrectomy   • Aftercare following surgery of the genitourinary system   • S/P CHICHO (total abdominal hysterectomy)       Social History Occupational History   • Not on file   Tobacco Use   • Smoking status: Never   • Smokeless tobacco: Never   Vaping Use   • Vaping Use: Never used   Substance and Sexual Activity   • Alcohol use: Not Currently     Comment: occ   • Drug use: Never   • Sexual activity: Yes     Partners: Male     Birth control/protection: None      Review of Systems        Objective:     Right Shoulder Exam     Tenderness   The patient is experiencing tenderness in the acromioclavicular joint and biceps tendon (There is some tenderness of the subacromial bursa and the supraspinatus tendon)  Range of Motion   Active abduction: 90   External rotation: 80   Forward flexion: 120   Internal rotation 0 degrees: L5     Muscle Strength   Abduction: 4/5   Internal rotation: 4/5   External rotation: 5/5   Supraspinatus: 4/5   Subscapularis: 4/5   Biceps: 4/5     Tests   Apprehension: negative  Sahu test: positive  Impingement: positive  Drop arm: negative    Other   Sensation: normal  Pulse: present    Comments:  Mild discomfort with crossarm  Physical Exam  Vitals and nursing note reviewed  Constitutional:       Appearance: She is well-developed  HENT:      Head: Normocephalic and atraumatic  Eyes:      Conjunctiva/sclera: Conjunctivae normal       Pupils: Pupils are equal, round, and reactive to light  Cardiovascular:      Rate and Rhythm: Normal rate  Pulmonary:      Effort: Pulmonary effort is normal  No respiratory distress  Skin:     General: Skin is warm and dry  Findings: No erythema  Neurological:      Mental Status: She is alert and oriented to person, place, and time  Cranial Nerves: No cranial nerve deficit  Psychiatric:         Behavior: Behavior normal          Thought Content:  Thought content normal          Judgment: Judgment normal            I have personally reviewed pertinent films in PACS and my interpretation is Plain radiograph of the right shoulder done today does not reveal any acute osseous abnormality or significant degenerative changes       Large joint arthrocentesis: R subacromial bursa  Universal Protocol:  Consent: Verbal consent obtained  Risks and benefits: risks, benefits and alternatives were discussed  Consent given by: patient  Patient understanding: patient states understanding of the procedure being performed  Site marked: the operative site was marked  Radiology Images displayed and confirmed   If images not available, report reviewed: imaging studies available  Required items: required blood products, implants, devices, and special equipment available  Patient identity confirmed: verbally with patient    Supporting Documentation  Indications: pain and diagnostic evaluation   Procedure Details  Location: shoulder - R subacromial bursa  Preparation: Patient was prepped and draped in the usual sterile fashion  Needle size: 22 G  Ultrasound guidance: no  Approach: lateral  Medications administered: 4 mL lidocaine (PF) 2 %; 40 mg triamcinolone acetonide 40 mg/mL    Patient tolerance: patient tolerated the procedure well with no immediate complications  Dressing:  Sterile dressing applied

## 2023-02-09 ENCOUNTER — EVALUATION (OUTPATIENT)
Dept: PHYSICAL THERAPY | Facility: OTHER | Age: 43
End: 2023-02-09

## 2023-02-09 DIAGNOSIS — M25.511 RIGHT SHOULDER PAIN, UNSPECIFIED CHRONICITY: ICD-10-CM

## 2023-02-09 DIAGNOSIS — M75.41 ROTATOR CUFF IMPINGEMENT SYNDROME, RIGHT: ICD-10-CM

## 2023-02-09 NOTE — PROGRESS NOTES
PT Evaluation     Today's date: 2023  Patient name: Noble Singleton  : 1980  MRN: [id number]  Referring provider: Be Cali MD  Dx:   Encounter Diagnosis     ICD-10-CM    1  Right shoulder pain, unspecified chronicity  M25 511 Ambulatory Referral to Physical Therapy      2  Rotator cuff impingement syndrome, right  M75 41 Ambulatory Referral to Physical Therapy          Start Time: 1016  Stop Time: 1105  Total time in clinic (min): 49 minutes    Assessment  Assessment details: Patient is a 40year old female who presents to physical therapy with signs and symptoms consistent with referring diagnosis of right shoulder impingement  No further referral appears necessary at this time based upon examination results  I expect patient will improve in 6-8 weeks  The patient's greatest concern is being able to perform ADL's, type at the computer, and lift in the gym all without pain  Patient was provided with a customized home exercise program to begin performing on their own  All patient questions and concerns have been addressed at this time  Problem List:  1  Right Shoulder IR AROM/PROM limitation, Right shoulder AROM limited all planes secondary to pain  2  B/L UT/MT weakness  3  R sided pec major shortened muscle length    Impairments: abnormal coordination, abnormal muscle firing, abnormal muscle tone, abnormal or restricted ROM, activity intolerance, impaired physical strength, lacks appropriate home exercise program, pain with function, scapular dyskinesis, poor posture  and poor body mechanics    Symptom irritability: moderateUnderstanding of Dx/Px/POC: good   Prognosis: good    Goals  Short Term Goals:   1  Patient will be independent with a customized HEP  2  Patient will report at least 40% improvement overall with ADL's and working/typing on the computer  3  Patient will improve pain with activity by 50%  4  Patient will demonstrate 4/5 MMT or better of B/L upper/mid trapezius     5  Patient will demonstrate normalized, pain free right shoulder AROM all planes  Long Term Goals:   1  Patient will improve FOTO to greater than goal of 76   2  Patient will improve pain with activity to 2/10 or less  3  Patient will continue with HEP independence to allow for decreased future reoccurrence of pain and loss in function  4  Patient will report at least 80% improvement overall with ADL's and working/typing on the computer  5  Patient will return to all of her normal gym lifting activities without pain or limitation  Plan  Plan details: Prognosis above is given PT services 2x/week tapering to 1x/week over the next 2-3 months and home program adherence  Patient would benefit from: PT eval and skilled physical therapy  Planned modality interventions: low level laser therapy, thermotherapy: hydrocollator packs and cryotherapy  Planned therapy interventions: neuromuscular re-education, therapeutic exercise, functional ROM exercises, graded exercise, home exercise program, therapeutic activities, strengthening, stretching, graded activity, flexibility, manual therapy, joint mobilization, balance/weight bearing training, activity modification, patient education and coordination  Frequency: 2x week  Duration in visits: 18  Duration in weeks: 12  Plan of Care beginning date: 2/9/2023  Plan of Care expiration date: 5/4/2023  Treatment plan discussed with: patient        Subjective Evaluation    History of Present Illness  Mechanism of injury: Patient is a 40year old female presenting to physical therapy with chief complaint of right shoulder pain  Patient reports this episode of pain/symptoms has been going on for 2 months and does not correlate it with any specific mechanism of injury (insidious onset)  States it was a slowly increasing pain that could have been due to overuse in the gym as she works out 3-4x/week   Patient reports it is worse at night, but is able to find positions of comfort and sleep through the night  She reports occasional headaches and neck pain, but not often, just "here and there " She reports 2 specific episodes over the last couple months where her right hand and entire R UE had a sharp pain and cramp/freezing when working in the kitchen  She states that eased up with massage and deep breathing  Not a recurrent problem   Quality of life: fair    Pain  Current pain ratin  At best pain ratin  At worst pain ratin  Location: General R shoulder "top of shoulder"  Quality: throbbing, dull ache, radiating and sharp  Relieving factors: rest, support and relaxation  Aggravating factors: keyboarding and overhead activity  Progression: worsening    Social Support    Employment status: working (, a lot of time on computer)  Hand dominance: right  Exercise history: Gym 3-4x/week    Patient Goals  Patient goals for therapy: independence with ADLs/IADLs, decreased pain, return to work, increased strength, return to sport/leisure activities and increased motion  Patient goal: Return to lifting in the gym without pain, typing/working without pain or limitation        Objective    Posture: Forward head/rounded shoulders  Palpation: TTP Right posterior RTC, UT, subscapularis  Myotomes (L/R): All intact B/L UE  Dermatome: (pinprick- L/R): All intact B/L UE      Reflexes:  (L/R) C5-6: 2+ B/L     Pitts: (-)                   Cervical  % of normal   Flex  100   Extn  100   SB Left 100 "stretch" R UT   SB Right 100   ROT Left 100   ROT Right 100         MMT         AROM          PROM    Shoulder       L       R        L           R      L     R   Flex   5/5 P  140 WFL WFL   Abd  5/5 4+/5   WFL WFL   IR  4+/5 4/5   50 20 P   ER  4+/5 4/5 P   95 95            Rhomboid         Mid Trap 3/5 3/5 P       Low Trap 3/5 3/5 P       Serratus         Infraspnatus         Teres Major         Sub Scap             Impingement Testing:   Luke: positive  Neers: positive  Arc Sign: positive    Neuro Dynamic Testing:  Median Nerve: L= -   R= inconclusive, re-assess NV  Ulnar Nerve: L= -   R= -     Radial Nerve: L= -   R= -         Pec Muscle length testing (supine, hands behind head, elbow drop):  R:  Fist + thumb from table  L: Fist from table       Precautions: HTN, anxiety  Access Code: [email or web address]  URL: [email or web address]  Date: 02/09/2023  Prepared by: Kasie David    Exercises  • Seated Scapular Retraction - 2-3 x daily - 7 x weekly - 10 reps - 5-10 second hold  • Single Arm Doorway Pec Stretch at 90 Degrees Abduction - 2 x daily - 7 x weekly - 10 reps - 10 seconds hold      Manuals IE 2/9/23        Shoulder PROM         Shoulder Mobs         Soft Tissue Deformation                  Neuro Re-Ed         Scap Squeeze 2x5x5-10" holds        Rows         I's         T's         Y's         No Money                  Ther Ex         UBE         Wall Slides          Pec Stretch         OH Press         IR stretch         Cross body stretch                           Ther Activity                           Gait Training                           Modalities

## 2023-02-15 ENCOUNTER — OFFICE VISIT (OUTPATIENT)
Dept: PHYSICAL THERAPY | Facility: OTHER | Age: 43
End: 2023-02-15

## 2023-02-15 DIAGNOSIS — M25.511 RIGHT SHOULDER PAIN, UNSPECIFIED CHRONICITY: Primary | ICD-10-CM

## 2023-02-15 DIAGNOSIS — M75.41 ROTATOR CUFF IMPINGEMENT SYNDROME, RIGHT: ICD-10-CM

## 2023-02-15 NOTE — PROGRESS NOTES
Daily Note     Today's date: 2/15/2023  Patient name: Chitra Miranda  : 1980  MRN: [id number]  Referring provider: Jacqueline Grey MD  Dx:   Encounter Diagnosis     ICD-10-CM    1  Right shoulder pain, unspecified chronicity  M25 511       2  Rotator cuff impingement syndrome, right  M75 41           Start Time: 1030  Stop Time: 1108  Total time in clinic (min): 38 minutes    Subjective: "I'm already starting to feel some improvement with the home exercises "       Objective: See treatment diary below    Increased tissue tension R subscap/teres - improved following manuals  T/spine hypomobile - improved following manuals  R Radial Nerve ANTT    Assessment: Tolerated treatment well  Her shoulder mobility and pain improved following manual treatment as demonstrated by being able to perform right shoulder flexion and functional ER within similar range as contralateral UE  Patient demonstrated fatigue post treatment, exhibited good technique with therapeutic exercises and would benefit from continued PT  Plan: Continue per plan of care  Precautions: HTN, anxiety  Access Code: [email or web address]  URL: [email or web address]  Date: 2023  Prepared by: Wilfrid Siemens    Exercises  • Seated Scapular Retraction - 2-3 x daily - 7 x weekly - 10 reps - 5-10 second hold  • Single Arm Doorway Pec Stretch at 90 Degrees Abduction - 2 x daily - 7 x weekly - 10 reps - 10 seconds hold      Manuals IE 2/9/23 2/15/23       Shoulder PROM  GJL       Shoulder Mobs         Soft Tissue Deformation  GJL subscap/ teres,    Along radial nerve pathway       T/spine  GJL assessment/mob Gr 3-4       Neuro Re-Ed         Scap Squeeze 2x5x5-10" holds Rev       Rows  Peach Tube 8x3" holds,  Orange Tube 2x8x3"       I's  Peach Tube 3x8x3" holds       T's         Y's         No Money                  Ther Ex         UBE  3'/3'       Wall Slides          Pec Stretch  Rev       OH Press         IR stretch         Cross body stretch         Thoracic Ext over chair  5x5" - HEP                Ther Activity                           Gait Training                           Modalities

## 2023-02-17 ENCOUNTER — OFFICE VISIT (OUTPATIENT)
Dept: PHYSICAL THERAPY | Facility: OTHER | Age: 43
End: 2023-02-17

## 2023-02-17 DIAGNOSIS — M75.41 ROTATOR CUFF IMPINGEMENT SYNDROME, RIGHT: ICD-10-CM

## 2023-02-17 DIAGNOSIS — M25.511 RIGHT SHOULDER PAIN, UNSPECIFIED CHRONICITY: Primary | ICD-10-CM

## 2023-02-21 ENCOUNTER — OFFICE VISIT (OUTPATIENT)
Dept: PHYSICAL THERAPY | Facility: OTHER | Age: 43
End: 2023-02-21

## 2023-02-21 DIAGNOSIS — M75.41 ROTATOR CUFF IMPINGEMENT SYNDROME, RIGHT: ICD-10-CM

## 2023-02-21 DIAGNOSIS — M25.511 RIGHT SHOULDER PAIN, UNSPECIFIED CHRONICITY: Primary | ICD-10-CM

## 2023-02-21 NOTE — PROGRESS NOTES
Daily Note     Today's date: 2023  Patient name: Devante Gaston  : 1980  MRN: [id number]  Referring provider: Deana Christianson MD  Dx:   Encounter Diagnosis     ICD-10-CM    1  Right shoulder pain, unspecified chronicity  M25 511       2  Rotator cuff impingement syndrome, right  M75 41           Start Time: 1600  Stop Time: 1643  Total time in clinic (min): 43 minutes    Subjective: "I'm feeling better  I was able to get back to some of my gym exercises with lighter weights "      Objective: See treatment diary below    FOTO: 79 (Goal 76)  Very mild tissue tension R subscap/teres - continues to improve following manuals  T/spine mobility much improved      Assessment: Tolerated treatment well  Required cuing for proper mid trap engagement with theratube T exercise and cues for low trap engagement with towel slide with lift  Progressing exercises within patient tolerance  Educated her on various lifting mechanics/form this visit  Patient demonstrated fatigue post treatment, exhibited good technique with therapeutic exercises and would benefit from continued PT  Plan: Continue per plan of care  Precautions: HTN, anxiety  Access Code: [email or web address]  URL: [email or web address]  Date: 2023  Prepared by: Arron Muff    Exercises  • Seated Scapular Retraction - 2-3 x daily - 7 x weekly - 10 reps - 5-10 second hold  • Single Arm Doorway Pec Stretch at 90 Degrees Abduction - 2 x daily - 7 x weekly - 10 reps - 10 seconds hold      Manuals IE 2/9/23 2/15/23 2/17/23 2/21/23     Shoulder PROM  GJL GJL subscap/ teres, GJL subscap/ teres,     Shoulder Mobs         Soft Tissue Deformation  GJL subscap/ teres,    Along radial nerve pathway GJL subscap/ teres, medial shoulder blade with active IR GJL subscap/ teres, medial shoulder blade with active IR     T/spine  GJL assessment/mob Gr 3-4 GJL Gr 3-4 GJL Gr 3-4     Neuro Re-Ed         Scap Squeeze 2x5x5-10" holds Rev       Rows  Peach Tube 8x3" holds,  Orange Tube 2x8x3" Green Tube 3x6x3" holds        I's  Peach Tube 3x8x3" holds Orange Tube 3x6x3" holds Orange Tube 2x8     T's    Orange Tube 2x6 cues for form     Y's         No Money         ER   Orange Tube 2x10 Green Tube 2x8     IR   Orange Tube 2x10 Green Tube 2x8     Towel slide LT lift    2x6                       Ther Ex         UBE  3'/3' 3'/3' 3'/3'     Wall Slides          Pec Stretch  Rev       OH Press         IR stretch         Cross body stretch         Thoracic Ext over chair  5x5" - HEP Rev Rev              Ther Activity         Lifting mechanics reviewed    Shoulder press, triceps overhead, bicep curls, tricep kickback, tricep pull downs              Gait Training                           Modalities

## 2023-02-23 ENCOUNTER — OFFICE VISIT (OUTPATIENT)
Dept: PHYSICAL THERAPY | Facility: OTHER | Age: 43
End: 2023-02-23

## 2023-02-23 DIAGNOSIS — M75.41 ROTATOR CUFF IMPINGEMENT SYNDROME, RIGHT: ICD-10-CM

## 2023-02-23 DIAGNOSIS — M25.511 RIGHT SHOULDER PAIN, UNSPECIFIED CHRONICITY: Primary | ICD-10-CM

## 2023-02-23 NOTE — PROGRESS NOTES
Daily Note     Today's date: 2023  Patient name: Leonardo Mosquera  : 1980  MRN: [id number]  Referring provider: Vickie Black MD  Dx:   Encounter Diagnosis     ICD-10-CM    1  Right shoulder pain, unspecified chronicity  M25 511       2  Rotator cuff impingement syndrome, right  M75 41           Start Time: 1535  Stop Time: 1619  Total time in clinic (min): 44 minutes  GJL PT 1 on 1 from 0449-2509 (28 minutes total)    Subjective: "Definitely feeling better " "I only had some pain with a certain shoulder machine in the gym, but was able to do everything else without pain "        Objective: See treatment diary below      Assessment: Tolerated treatment well with continued focus on mobility, scapular/rotator cuff strengthening/neuromuscular control  Patient demonstrated fatigue post treatment, exhibited good technique with therapeutic exercises and would benefit from continued PT  Plan: Continue per plan of care  Precautions: HTN, anxiety  Access Code: [email or web address]  URL: [email or web address]  Date: 2023  Prepared by: Hieu Ortiz    Exercises  • Seated Scapular Retraction - 2-3 x daily - 7 x weekly - 10 reps - 5-10 second hold  • Single Arm Doorway Pec Stretch at 90 Degrees Abduction - 2 x daily - 7 x weekly - 10 reps - 10 seconds hold      Manuals IE 2/9/23 2/15/23 2/17/23 2/21/23 2/23/23    Shoulder PROM  GJL GJL subscap/ teres, GJL subscap/ teres, GJL subscap/ teres,    Shoulder Mobs         Soft Tissue Deformation  GJL subscap/ teres,    Along radial nerve pathway GJL subscap/ teres, medial shoulder blade with active IR GJL subscap/ teres, medial shoulder blade with active IR GJL subscap/ teres, medial shoulder blade with active IR    T/spine  GJL assessment/mob Gr 3-4 GJL Gr 3-4 GJL Gr 3-4     Neuro Re-Ed         Scap Squeeze 2x5x5-10" holds Rev       Rows  Peach Tube 8x3" holds,  Orange Tube 2x8x3" Green Tube 3x6x3" holds        I's  Peach Tube 3x8x3" holds [employer] Tube 3x6x3" holds Orange Tube 2x8 Orange Tube 3x8    T's    Orange Tube 2x6 cues for form Peach Tube 3x8x3" hold    Y's         No Money         ER   Orange Tube 2x10 Green Tube 2x8 Green Tube 3x8    IR   Orange Tube 2x10 Green Tube 2x8 Green Tube 3x8    Towel slide LT lift    2x6 3x8                      Ther Ex         UBE  3'/3' 3'/3' 3'/3' 4'/4'    Wall Slides          Pec Stretch  Rev       OH Press         IR stretch         Cross body stretch         Thoracic Ext over chair  5x5" - HEP Rev Rev              Ther Activity         Lifting mechanics reviewed    Shoulder press, triceps overhead, bicep curls, tricep kickback, tricep pull downs              Gait Training                           Modalities

## 2023-02-28 ENCOUNTER — OFFICE VISIT (OUTPATIENT)
Dept: PHYSICAL THERAPY | Facility: OTHER | Age: 43
End: 2023-02-28

## 2023-02-28 DIAGNOSIS — M75.41 ROTATOR CUFF IMPINGEMENT SYNDROME, RIGHT: ICD-10-CM

## 2023-02-28 DIAGNOSIS — M25.511 RIGHT SHOULDER PAIN, UNSPECIFIED CHRONICITY: Primary | ICD-10-CM

## 2023-03-02 ENCOUNTER — OFFICE VISIT (OUTPATIENT)
Dept: PHYSICAL THERAPY | Facility: OTHER | Age: 43
End: 2023-03-02

## 2023-03-02 DIAGNOSIS — M75.41 ROTATOR CUFF IMPINGEMENT SYNDROME, RIGHT: ICD-10-CM

## 2023-03-02 DIAGNOSIS — M25.511 RIGHT SHOULDER PAIN, UNSPECIFIED CHRONICITY: Primary | ICD-10-CM

## 2023-03-02 NOTE — PROGRESS NOTES
Re-Assessment/Discharge Summary     Today's date: 3/2/2023  Patient name: Mynor Chicas  : 1980  MRN: [id number]  Referring provider: Nigel Carrasco MD  Dx:   Encounter Diagnosis     ICD-10-CM    1  Right shoulder pain, unspecified chronicity  M25 511       2  Rotator cuff impingement syndrome, right  M75 41           Start Time: 1605  Stop Time: 1640  Total time in clinic (min): 35 minutes      Subjective: Patient reports feeling 100% improved overall since beginning physical therapy  She states feeling back to normal and is ready for discharge at this time  She states she will keep up with her home exercises to prevent the pain from happening again  Objective: See treatment diary below      Posture: Improved posture, much less forward head/rounding of shoulders  Palpation: No TTP Noted  Myotomes (L/R): All intact B/L UE  Dermatome: (pinprick- L/R): All intact B/L UE      Reflexes:  (L/R) C5-6: 2+ B/L     Pitts: (-)                   Cervical  % of normal   Flex  100   Extn  100   SB Left 100   SB Right 100   ROT Left 100   ROT Right 100         MMT         AROM          PROM    Shoulder       L       R        L           R      L     R   Flex  5/5 5/5   WFL WFL   Abd  5/5 5/5   WFL WFL   IR  5/5 5/5   WFL WFL   ER  5/5 5/5    WFL WFL            Rhomboid         Mid Trap 4/5 4/5        Low Trap 4/5 4/5       Serratus         Infraspnatus         Teres Major         Sub Scap             Neuro Dynamic Testing:  Median Nerve: L= -   R= -  Ulnar Nerve: L= -   R= -     Radial Nerve: L= -   R= -           Assessment: Patient is a 40 y o  female who has attended 7 physical therapy visits, including this one, for right shoulder pain/impingement  Patient has made significant improvements in pain, mobility, strength, neuromuscular control, and overall function since beginning physical therapy  Patient has met all short term and long term goals at this time   All patient questions and concerns have been addressed this visit  Updated HEP this visit  Patient discharged to [facility] at this time  Goals  Short Term Goals:   1  Patient will be independent with a customized HEP  - MET  2  Patient will report at least 40% improvement overall with ADL's and working/typing on the computer  - MET  3  Patient will improve pain with activity by 50%  - MET  4  Patient will demonstrate 4/5 MMT or better of B/L upper/mid trapezius   - MET  5  Patient will demonstrate normalized, pain free right shoulder AROM all planes  - MET    Long Term Goals:   1  Patient will improve FOTO to greater than goal of 76  - MET (101)  2  Patient will improve pain with activity to 2/10 or less  - MET  3  Patient will continue with HEP independence to allow for decreased future reoccurrence of pain and loss in function  - MET  4  Patient will report at least 80% improvement overall with ADL's and working/typing on the computer  - MET  5  Patient will return to all of her normal gym lifting activities without pain or limitation  - MET    Plan: Patient discharged to [facility] at this time  Precautions: HTN, anxiety  Access Code: [email or web address]  URL: [email or web address]  Date: 03/02/2023  Prepared by: Jesus Escobedo    Exercises  • Single Arm Doorway Pec Stretch at 90 Degrees Abduction - 2 x daily - 7 x weekly - 10 reps - 10 seconds hold  • Single Arm Low Trap Setting at Wall - 1-2 x daily - 3-4 x weekly - 3 sets - 8 reps  • Shoulder Extension with Resistance - 1-2 x daily - 3-4 x weekly - 3 sets - 8 reps  • Standing Shoulder Horizontal Abduction with Anchored Resistance - 1-2 x daily - 3-4 x weekly - 3 sets - 8 reps  • Shoulder External Rotation in Abduction with Anchored Resistance - 1-2 x daily - 3-4 x weekly - 3 sets - 8 reps    Manuals IE 2/9/23 2/15/23 2/17/23 2/21/23 2/23/23 2/28/23 3/2/23   Shoulder PROM  GJL GJL subscap/ teres, GJL subscap/ teres, GJL subscap/ teres,     Shoulder Mobs          Soft Tissue Deformation  GJL subscap/ teres,    Along radial nerve pathway GJL subscap/ teres, medial shoulder blade with active IR GJL subscap/ teres, medial shoulder blade with active IR GJL subscap/ teres, medial shoulder blade with active IR     T/spine  GJL assessment/mob Gr 3-4 GJL Gr 3-4 GJL Gr 3-4      Assessment       GJL   Neuro Re-Ed          Scap Squeeze 2x5x5-10" holds Rev        Rows  Peach Tube 8x3" holds,  Orange Tube 2x8x3" Green Tube 3x6x3" holds         I's  Peach Tube 3x8x3" holds Orange Tube 3x6x3" holds Orange Tube 2x8 Orange Tube 3x8 Peach Tube 3x10 Peach Tube 10x HEP   T's    Orange Tube 2x6 cues for form Peach Tube 3x8x3" hold Peach Tube 3x8 Peach Tube 10x HEP   Y's      Peach Tube 2x6 Peach Tube 10x    W's      Peach Tube 3x6 Peach Tube 10x HEP   ER   [employer] Tube 2x10 Green Tube 2x8 Green Tube 3x8     IR   Orange Tube 2x10 Green Tube 2x8 Green Tube 3x8     Towel slide LT lift    2x6 3x8 3x10 10x HEP                       Ther Ex          UBE  3'/3' 3'/3' 3'/3' 4'/4' 4'/4' 3'/3'   Pec Stretch  Rev     Rev - HEP   OH Press      Rev    IR stretch          Cross body stretch          Thoracic Ext over chair  5x5" - HEP Rev Rev      KB carries      5# KB, 90/90 hold flexion/ scaption, 2 laps turf ea    Ther Activity          Lifting mechanics reviewed    Shoulder press, triceps overhead, bicep curls, tricep kickback, tricep pull downs                Gait Training                              Modalities

## 2023-03-17 NOTE — PROGRESS NOTES
Daily Note     Today's date: 2023  Patient name: Chitra Miranda  : 1980  MRN: [id number]  Referring provider: Jacqueline Grey MD  Dx:   Encounter Diagnosis     ICD-10-CM    1  Right shoulder pain, unspecified chronicity  M25 511       2  Rotator cuff impingement syndrome, right  M75 41           Start Time: 0815  Stop Time: 0858  Total time in clinic (min): 43 minutes    Subjective: "Slowly getting better, I have been doing my exercises"       Objective: See treatment diary below    Increased tissue tension R subscap/teres - continues to improve following manuals  T/spine hypomobile - continues to improve following manuals      Assessment: Tolerated treatment well  Her shoulder mobility and pain continue to improve following manual treatment  Progressing exercises within tolerance  Patient demonstrated fatigue post treatment, exhibited good technique with therapeutic exercises and would benefit from continued PT  Plan: Continue per plan of care  Precautions: HTN, anxiety  Access Code: [email or web address]  URL: [email or web address]  Date: 2023  Prepared by: Wilfrid Siemens    Exercises  • Seated Scapular Retraction - 2-3 x daily - 7 x weekly - 10 reps - 5-10 second hold  • Single Arm Doorway Pec Stretch at 90 Degrees Abduction - 2 x daily - 7 x weekly - 10 reps - 10 seconds hold      Manuals IE 2/9/23 2/15/23 2/17/23      Shoulder PROM  GJL GJL subscap/ teres,      Shoulder Mobs         Soft Tissue Deformation  GJL subscap/ teres,    Along radial nerve pathway GJL subscap/ teres, medial shoulder blade with active IR      T/spine  GJL assessment/mob Gr 3-4 GJL Gr 3-4      Neuro Re-Ed         Scap Squeeze 2x5x5-10" holds Rev       Rows  Peach Tube 8x3" holds,  Orange Tube 2x8x3" Green Tube 3x6x3" holds        I's  Peach Tube 3x8x3" holds Orange Tube 3x6x3" holds      T's         Y's         No Money         ER   Orange Tube 2x10      IR   Orange Tube 2x10      Ther Ex         UBE 3'/3' 3'/3'      Wall Slides          Pec Stretch  Rev       OH Press         IR stretch         Cross body stretch         Thoracic Ext over chair  5x5" - HEP Rev               Ther Activity                           Gait Training                           Modalities Follow up with your  primary doctor in 1 week , call for appointment

## 2023-07-07 ENCOUNTER — HOSPITAL ENCOUNTER (EMERGENCY)
Facility: HOSPITAL | Age: 43
Discharge: HOME | End: 2023-07-07
Attending: EMERGENCY MEDICINE
Payer: COMMERCIAL

## 2023-07-07 VITALS
TEMPERATURE: 98.3 F | SYSTOLIC BLOOD PRESSURE: 156 MMHG | HEART RATE: 67 BPM | OXYGEN SATURATION: 99 % | RESPIRATION RATE: 18 BRPM | DIASTOLIC BLOOD PRESSURE: 95 MMHG

## 2023-07-07 DIAGNOSIS — B34.9 VIRAL SYNDROME: Primary | ICD-10-CM

## 2023-07-07 LAB
ANION GAP SERPL CALCULATED.3IONS-SCNC: 3 MMOL/L
BUN SERPL-MCNC: 8 MG/DL (ref 5–25)
CALCIUM SERPL-MCNC: 9.2 MG/DL (ref 8.3–10.1)
CHLORIDE SERPL-SCNC: 110 MMOL/L (ref 96–108)
CO2 SERPL-SCNC: 26 MMOL/L (ref 21–32)
CREAT SERPL-MCNC: 0.78 MG/DL (ref 0.6–1.3)
GFR SERPL CREATININE-BSD FRML MDRD: 94 ML/MIN/1.73SQ M
GLUCOSE SERPL-MCNC: 96 MG/DL (ref 65–140)
POTASSIUM SERPL-SCNC: 3.5 MMOL/L (ref 3.5–5.3)
SODIUM SERPL-SCNC: 139 MMOL/L (ref 135–147)

## 2023-07-07 PROCEDURE — 93005 ELECTROCARDIOGRAM TRACING: CPT

## 2023-07-07 PROCEDURE — 36415 COLL VENOUS BLD VENIPUNCTURE: CPT

## 2023-07-07 PROCEDURE — 99284 EMERGENCY DEPT VISIT MOD MDM: CPT

## 2023-07-07 PROCEDURE — 80048 BASIC METABOLIC PNL TOTAL CA: CPT

## 2023-07-07 RX ORDER — ACETAMINOPHEN 325 MG/1
650 TABLET ORAL ONCE
Status: COMPLETED | OUTPATIENT
Start: 2023-07-07 | End: 2023-07-07

## 2023-07-07 RX ORDER — ALBUTEROL SULFATE 90 UG/1
2 AEROSOL, METERED RESPIRATORY (INHALATION) ONCE
Status: COMPLETED | OUTPATIENT
Start: 2023-07-07 | End: 2023-07-07

## 2023-07-07 RX ADMIN — ACETAMINOPHEN 650 MG: 325 TABLET, FILM COATED ORAL at 19:24

## 2023-07-07 RX ADMIN — DEXAMETHASONE 10 MG: 2 TABLET ORAL at 19:24

## 2023-07-07 RX ADMIN — ALBUTEROL SULFATE 2 PUFF: 90 AEROSOL, METERED RESPIRATORY (INHALATION) at 19:24

## 2023-07-07 NOTE — DISCHARGE INSTRUCTIONS
Thank you for coming to the ED for your care. Your symptoms are consistent with a viral syndrome. Your labs and EKG show no signs that this is related to a heart attack or other emergency. We recommend continuing to monitor your symptoms, take tylenol as needed for the throat discomfort, and follow up with your family doctor.

## 2023-07-07 NOTE — Clinical Note
Kajal Xiao was seen and treated in our emergency department on 7/7/2023. Diagnosis:     Shakeema  . She may return on this date: 07/08/2023         If you have any questions or concerns, please don't hesitate to call.       Elzbieta Aggarwal MD    ______________________________           _______________          _______________  Hospital Representative                              Date                                Time

## 2023-07-07 NOTE — ED PROVIDER NOTES
History  Chief Complaint   Patient presents with   • Chest Pain     Intermittent Tightness in chest beginning yesterday evening. Denies SOB at this time but was SOB yesterday     71-year-old female with history of anemia, hypokalemia, hypertension, sleeve gastrectomy, presents to the ED due to sore throat/globus sensation, congestion, and left upper chest tightness. Also endorses intermittent, nonproductive cough. Patient notes that she has had similar episodes over the past month. She was evaluated at [facility]  for similar symptoms and had a negative work-up. Her current symptoms started a couple days ago and progressively worsened. She denies any fevers, chills, nausea, abdominal pain, diarrhea, or other complaints at this time. She is not taking medications to help with her symptoms, but has been compliant with her potassium supplements for hypokalemia found at visit earlier in the month. Prior to Admission Medications   Prescriptions Last Dose Informant Patient Reported? Taking? clotrimazole (LOTRIMIN) 1 % cream   Yes No   Sig: APPLY TO AFFECTED AREA TWICE A DAY IN THE MORNING AND IN THE EVENING   clotrimazole-betamethasone (LOTRISONE) 1-0.05 % cream  Self Yes No   Si (two) times a day Apply sparingly to affected area   estrogens, conjugated (Premarin) vaginal cream   No No   Sig: Insert 0.5 g into the vagina daily   ferrous sulfate 325 (65 Fe) mg tablet  Self Yes No   Sig: Take 325 mg by mouth daily with breakfast   methocarbamol (ROBAXIN) 750 mg tablet   Yes No   Sig: TAKE 1 TABLET (750 MG TOTAL) BY MOUTH 4 (FOUR) TIMES A DAY AS NEEDED FOR MUSCLE SPASMS.    Patient not taking: Reported on 2023   metoprolol succinate (TOPROL-XL) 100 mg 24 hr tablet   Yes No   Sig: Take 100 mg by mouth daily   Patient not taking: Reported on 2023   metoprolol tartrate (LOPRESSOR) 100 mg tablet  Self Yes No   Sig: Take 100 mg by mouth   mirtazapine (REMERON) 7.5 MG tablet  Self Yes No   Sig: TAKE 1 TABLET (7.5 MG) BY ORAL ROUTE ONCE DAILY AT BEDTIME   naproxen sodium (ANAPROX) 550 mg tablet   Yes No   Sig: Take 550 mg by mouth every 12 (twelve) hours as needed   pantoprazole (PROTONIX) 40 mg tablet   Yes No      Facility-Administered Medications: None       Past Medical History:   Diagnosis Date   • Abnormal Pap smear of cervix        • Anemia    • Depression    • Fibroid    • GERD (gastroesophageal reflux disease)    • Hypertension    • Iron deficiency anemia        Past Surgical History:   Procedure Laterality Date   • BARIATRIC SURGERY  2018   •  SECTION     • CHOLECYSTECTOMY     • COLPOSCOPY     • GASTRIC RESTRICTION SURGERY     • AZ TOTAL ABDOMINAL HYSTERECT W/WO RMVL TUBE OVARY N/A 2021    Procedure: HYSTERECTOMY TOTAL ABDOMINAL (CHICHO) BILATERAL SALPINGECTOMY;  Surgeon: Maureen Escamilla MD;  Location: [facility];  Service: Gynecology       Family History   Problem Relation Age of Onset   • Diabetes Mother         Borderline   • Hypertension Mother    • Thyroid disease Mother    • Heart disease Father    • Heart failure Father         Passed away   • Diabetes Sister    • Hypertension Sister    • Heart disease Paternal Grandmother    • Heart failure Maternal Grandmother         Passed away   • Heart disease Maternal Grandmother    • Hypertension Sister    • Hypertension Brother      I have reviewed and agree with the history as documented. E-Cigarette/Vaping   • E-Cigarette Use Never User      E-Cigarette/Vaping Substances     Social History     Tobacco Use   • Smoking status: Never   • Smokeless tobacco: Never   Vaping Use   • Vaping Use: Never used   Substance Use Topics   • Alcohol use: Yes     Comment: occ   • Drug use: Never        Review of Systems   All other systems reviewed and are negative.       Physical Exam  ED Triage Vitals [23 1720]   Temperature Pulse Respirations Blood Pressure SpO2   98.3 °F (36.8 °C) 67 18 156/95 99 %      Temp Source Heart Rate Source Patient Position - Orthostatic VS BP Location FiO2 (%)   Tympanic Monitor Lying Left arm --      Pain Score       --             Orthostatic Vital Signs  Vitals:    07/07/23 1720   BP: 156/95   Pulse: 67   Patient Position - Orthostatic VS: Lying       Physical Exam  Vitals and nursing note reviewed. Constitutional:       General: She is not in acute distress. Appearance: She is well-developed. HENT:      Head: Normocephalic and atraumatic. Mouth/Throat:      Comments: Erythematous oropharynx. Eyes:      Conjunctiva/sclera: Conjunctivae normal.   Neck:      Comments: Enlarged thyroid on palpation  Cardiovascular:      Rate and Rhythm: Normal rate and regular rhythm. Heart sounds: No murmur heard. Pulmonary:      Effort: Pulmonary effort is normal. No respiratory distress. Breath sounds: Normal breath sounds. Abdominal:      Palpations: Abdomen is soft. Tenderness: There is no abdominal tenderness. Musculoskeletal:         General: No swelling. Cervical back: Neck supple. Skin:     General: Skin is warm and dry. Capillary Refill: Capillary refill takes less than 2 seconds. Neurological:      General: No focal deficit present. Mental Status: She is alert.    Psychiatric:         Mood and Affect: Mood normal.         ED Medications  Medications   dexamethasone (DECADRON) tablet 10 mg (10 mg Oral Given 7/7/23 1924)   albuterol (PROVENTIL HFA,VENTOLIN HFA) inhaler 2 puff (2 puffs Inhalation Given 7/7/23 1924)   acetaminophen (TYLENOL) tablet 650 mg (650 mg Oral Given 7/7/23 1924)       Diagnostic Studies  Results Reviewed     Procedure Component Value Units Date/Time    Basic metabolic panel [[id number]]  (Abnormal) Collected: 07/07/23 1818    Lab Status: Final result Specimen: Blood from Arm, Left Updated: 07/07/23 1842     Sodium 139 mmol/L      Potassium 3.5 mmol/L      Chloride 110 mmol/L      CO2 26 mmol/L      ANION GAP 3 mmol/L      BUN 8 mg/dL      Creatinine 0.78 mg/dL      Glucose 96 mg/dL      Calcium 9.2 mg/dL      eGFR 94 ml/min/1.73sq m     Narrative:      [place] guidelines for Chronic Kidney Disease (CKD):   •  Stage 1 with normal or high GFR (GFR > 90 mL/min/1.73 square meters)  •  Stage 2 Mild CKD (GFR = 60-89 mL/min/1.73 square meters)  •  Stage 3A Moderate CKD (GFR = 45-59 mL/min/1.73 square meters)  •  Stage 3B Moderate CKD (GFR = 30-44 mL/min/1.73 square meters)  •  Stage 4 Severe CKD (GFR = 15-29 mL/min/1.73 square meters)  •  Stage 5 End Stage CKD (GFR <15 mL/min/1.73 square meters)  Note: GFR calculation is accurate only with a steady state creatinine                 No orders to display         Procedures  ECG 12 Lead Documentation Only    Date/Time: 7/7/2023 6:30 PM    Performed by: Renae Mcintyre MD  Authorized by: Renae Mcintyre MD    ECG reviewed by me, the ED Provider: yes    Patient location:  ED  Interpretation:     Interpretation: normal    Rate:     ECG rate assessment: normal    Rhythm:     Rhythm: sinus rhythm    Ectopy:     Ectopy: none    QRS:     QRS axis:  Normal    QRS intervals:  Normal  Conduction:     Conduction: normal    ST segments:     ST segments:  Normal  T waves:     T waves: normal            ED Course                                       Medical Decision Making  60-year-old female presenting for constellation of symptoms that are not consistent with emergent pathology such as ACS, pneumonia, pneumothorax, or other intrathoracic emergencies. May be viral syndrome in etiology with erythematous oropharynx as well as new cough and congestion. Patient does also have family history of thyroid goiter and does have fullness of her anterior neck indicating possible goiter. Reviewed lab work done 1 year ago showing no abnormalities with her thyroid levels. Will obtain lecture lites to evaluate potassium due to recent hypokalemia. EKG reassuring at this time.   Discussed findings with patient including normal labs, EKG. Recommend follow-up with primary care doctor for further evaluation of enlarged thyroid. Patient agreeable with plan and discharged with further home care recommendations and return precautions. Amount and/or Complexity of Data Reviewed  Labs: ordered. Risk  OTC drugs. Prescription drug management. Disposition  Final diagnoses:   Viral syndrome     Time reflects when diagnosis was documented in both MDM as applicable and the Disposition within this note     Time User Action Codes Description Comment    7/7/2023  7:10 PM Peterson Samson Rolando [B34.9] Viral syndrome       ED Disposition     ED Disposition   Discharge    Condition   Stable    Date/Time   Fri Jul 7, 2023  7:10 PM    Comment   Lauren Lopez discharge to home/self care.                Follow-up Information     Follow up With Specialties Details Why Contact Info    David Thomas MD [address]  [phone]            Discharge Medication List as of 7/7/2023  7:32 PM      CONTINUE these medications which have NOT CHANGED    Details   clotrimazole (LOTRIMIN) 1 % cream APPLY TO AFFECTED AREA TWICE A DAY IN THE MORNING AND IN THE EVENING, Historical Med      clotrimazole-betamethasone (LOTRISONE) 1-0.05 % cream 2 (two) times a day Apply sparingly to affected area, Starting Thu 2/27/2020, Historical Med      estrogens, conjugated (Premarin) vaginal cream Insert 0.5 g into the vagina daily, Starting Thu 9/29/2022, Normal      ferrous sulfate 325 (65 Fe) mg tablet Take 325 mg by mouth daily with breakfast, Historical Med      methocarbamol (ROBAXIN) 750 mg tablet TAKE 1 TABLET (750 MG TOTAL) BY MOUTH 4 (FOUR) TIMES A DAY AS NEEDED FOR MUSCLE SPASMS., Historical Med      metoprolol succinate (TOPROL-XL) 100 mg 24 hr tablet Take 100 mg by mouth daily, Starting Mon 11/21/2022, Historical Med      metoprolol tartrate (LOPRESSOR) 100 mg tablet Take 100 mg by mouth, Starting Mon 1/8/2018, Historical Med      mirtazapine (REMERON) 7.5 MG tablet TAKE 1 TABLET (7.5 MG) BY ORAL ROUTE ONCE DAILY AT BEDTIME, Historical Med      naproxen sodium (ANAPROX) 550 mg tablet Take 550 mg by mouth every 12 (twelve) hours as needed, Starting Wed 1/18/2023, Historical Med      pantoprazole (PROTONIX) 40 mg tablet Starting Mon 9/26/2022, Historical Med           No discharge procedures on file. PDMP Review     None           ED Provider  Attending physically available and evaluated Morelia Skill. I managed the patient along with the ED Attending.     Electronically Signed by         Kendrick Clifford MD  07/09/23 0451

## 2023-07-08 LAB
ATRIAL RATE: 63 BPM
P AXIS: 36 DEGREES
PR INTERVAL: 136 MS
QRS AXIS: 25 DEGREES
QRSD INTERVAL: 82 MS
QT INTERVAL: 386 MS
QTC INTERVAL: 395 MS
T WAVE AXIS: 8 DEGREES
VENTRICULAR RATE: 63 BPM

## 2023-07-08 PROCEDURE — 93010 ELECTROCARDIOGRAM REPORT: CPT | Performed by: INTERNAL MEDICINE

## 2023-07-11 NOTE — ED ATTENDING ATTESTATION
7/7/2023  IJanie MD, saw and evaluated the patient. I have discussed the patient with the resident/non-physician practitioner and agree with the resident's/non-physician practitioner's findings, Plan of Care, and MDM as documented in the resident's/non-physician practitioner's note, except where noted. All available labs and Radiology studies were reviewed. I was present for key portions of any procedure(s) performed by the resident/non-physician practitioner and I was immediately available to provide assistance. At this point I agree with the current assessment done in the Emergency Department. I have conducted an independent evaluation of this patient a history and physical is as follows:    ED Course      42-year-old female presents with chest tightness and shortness of breath onset of symptoms was yesterday. Patient has a history of hypokalemia anemia and hypertension sleeve gastrectomy also states she has a sore throat with globus sensation congestion left upper chest tightness. Vitals reviewed. Throat mildly erythematous oropharynx with enlarged thyroid on palpation. Heart regular rate and rhythm without murmurs. Lungs clear to auscultation bilaterally. Abdomen soft nontender nondistended normal bowel sounds. Extremities no edema. Impression: Chest tightness, sore throat, shortness of breath  Differential diagnosis: ACS MI arrhythmia, electrolyte abnormality, bronchitis, viral URI. Doubt pneumonia doubt pneumothorax    Plan check ECG and BMP treat with dexamethasone albuterol and Tylenol    ECG independently interpreted by me normal sinus rhythm normal rate normal axis normal intervals no acute ST-T changes. BMP reviewed unremarkable.     Patient be discharged home follow-up PCP as outpatient return cautions given        Impression:      Critical Care Time  Procedures

## 2023-07-21 ENCOUNTER — HOSPITAL ENCOUNTER (OUTPATIENT)
Dept: RADIOLOGY | Facility: HOSPITAL | Age: 43
Discharge: HOME | End: 2023-07-21
Payer: COMMERCIAL

## 2023-07-21 DIAGNOSIS — R13.10 DYSPHAGIA, UNSPECIFIED: ICD-10-CM

## 2023-07-21 PROCEDURE — 74220 X-RAY XM ESOPHAGUS 1CNTRST: CPT

## 2023-07-22 ENCOUNTER — HOSPITAL ENCOUNTER (OUTPATIENT)
Dept: RADIOLOGY | Facility: HOSPITAL | Age: 43
Discharge: HOME | End: 2023-07-22
Payer: COMMERCIAL

## 2023-07-22 DIAGNOSIS — E07.9 DISORDER OF THYROID, UNSPECIFIED: ICD-10-CM

## 2023-07-22 PROCEDURE — 76536 US EXAM OF HEAD AND NECK: CPT

## 2024-02-21 PROBLEM — Z12.9 SPECIAL SCREENING FOR MALIGNANT NEOPLASM: Status: RESOLVED | Noted: 2019-03-13 | Resolved: 2024-02-21

## 2024-09-11 ENCOUNTER — APPOINTMENT (EMERGENCY)
Dept: RADIOLOGY | Facility: HOSPITAL | Age: 44
End: 2024-09-11
Payer: COMMERCIAL

## 2024-09-11 ENCOUNTER — HOSPITAL ENCOUNTER (EMERGENCY)
Facility: HOSPITAL | Age: 44
Discharge: HOME | End: 2024-09-11
Attending: EMERGENCY MEDICINE
Payer: COMMERCIAL

## 2024-09-11 VITALS
TEMPERATURE: 98 F | OXYGEN SATURATION: 100 % | DIASTOLIC BLOOD PRESSURE: 76 MMHG | RESPIRATION RATE: 20 BRPM | SYSTOLIC BLOOD PRESSURE: 143 MMHG | HEART RATE: 69 BPM

## 2024-09-11 DIAGNOSIS — R55 NEAR SYNCOPE: ICD-10-CM

## 2024-09-11 DIAGNOSIS — R42 LIGHTHEADEDNESS: Primary | ICD-10-CM

## 2024-09-11 LAB
2HR DELTA HS TROPONIN: 1 NG/L
ANION GAP SERPL CALCULATED.3IONS-SCNC: 6 MMOL/L (ref 4–13)
ATRIAL RATE: 54 BPM
BASOPHILS # BLD AUTO: 0.05 THOUSANDS/ΜL (ref 0–0.1)
BASOPHILS NFR BLD AUTO: 1 % (ref 0–1)
BILIRUB UR QL STRIP: NEGATIVE
BUN SERPL-MCNC: 7 MG/DL (ref 5–25)
CALCIUM SERPL-MCNC: 8 MG/DL (ref 8.4–10.2)
CARDIAC TROPONIN I PNL SERPL HS: 8 NG/L
CARDIAC TROPONIN I PNL SERPL HS: 9 NG/L
CHLORIDE SERPL-SCNC: 107 MMOL/L (ref 96–108)
CLARITY UR: CLEAR
CO2 SERPL-SCNC: 25 MMOL/L (ref 21–32)
COLOR UR: ABNORMAL
CREAT SERPL-MCNC: 0.78 MG/DL (ref 0.6–1.3)
EOSINOPHIL # BLD AUTO: 0.09 THOUSAND/ΜL (ref 0–0.61)
EOSINOPHIL NFR BLD AUTO: 2 % (ref 0–6)
ERYTHROCYTE [DISTWIDTH] IN BLOOD BY AUTOMATED COUNT: 11.7 % (ref 11.6–15.1)
GFR SERPL CREATININE-BSD FRML MDRD: 93 ML/MIN/1.73SQ M
GLUCOSE SERPL-MCNC: 92 MG/DL (ref 65–140)
GLUCOSE UR STRIP-MCNC: NEGATIVE MG/DL
HCT VFR BLD AUTO: 39.6 % (ref 34.8–46.1)
HGB BLD-MCNC: 12.6 G/DL (ref 11.5–15.4)
HGB UR QL STRIP.AUTO: NEGATIVE
IMM GRANULOCYTES # BLD AUTO: 0.01 THOUSAND/UL (ref 0–0.2)
IMM GRANULOCYTES NFR BLD AUTO: 0 % (ref 0–2)
KETONES UR STRIP-MCNC: ABNORMAL MG/DL
LEUKOCYTE ESTERASE UR QL STRIP: NEGATIVE
LYMPHOCYTES # BLD AUTO: 1.82 THOUSANDS/ΜL (ref 0.6–4.47)
LYMPHOCYTES NFR BLD AUTO: 34 % (ref 14–44)
MCH RBC QN AUTO: 30.7 PG (ref 26.8–34.3)
MCHC RBC AUTO-ENTMCNC: 31.8 G/DL (ref 31.4–37.4)
MCV RBC AUTO: 96 FL (ref 82–98)
MONOCYTES # BLD AUTO: 0.41 THOUSAND/ΜL (ref 0.17–1.22)
MONOCYTES NFR BLD AUTO: 8 % (ref 4–12)
NEUTROPHILS # BLD AUTO: 2.91 THOUSANDS/ΜL (ref 1.85–7.62)
NEUTS SEG NFR BLD AUTO: 55 % (ref 43–75)
NITRITE UR QL STRIP: NEGATIVE
NRBC BLD AUTO-RTO: 0 /100 WBCS
P AXIS: 62 DEGREES
PH UR STRIP.AUTO: 6.5 [PH]
PLATELET # BLD AUTO: 334 THOUSANDS/UL (ref 149–390)
PMV BLD AUTO: 9.2 FL (ref 8.9–12.7)
POTASSIUM SERPL-SCNC: 3.5 MMOL/L (ref 3.5–5.3)
PR INTERVAL: 140 MS
PROT UR STRIP-MCNC: NEGATIVE MG/DL
QRS AXIS: 66 DEGREES
QRSD INTERVAL: 82 MS
QT INTERVAL: 426 MS
QTC INTERVAL: 403 MS
RBC # BLD AUTO: 4.11 MILLION/UL (ref 3.81–5.12)
SODIUM SERPL-SCNC: 138 MMOL/L (ref 135–147)
SP GR UR STRIP.AUTO: 1.01 (ref 1–1.03)
T WAVE AXIS: 55 DEGREES
TSH SERPL DL<=0.05 MIU/L-ACNC: 1.14 UIU/ML (ref 0.45–4.5)
UROBILINOGEN UR STRIP-ACNC: <2 MG/DL
VENTRICULAR RATE: 54 BPM
WBC # BLD AUTO: 5.29 THOUSAND/UL (ref 4.31–10.16)

## 2024-09-11 PROCEDURE — 93010 ELECTROCARDIOGRAM REPORT: CPT | Performed by: INTERNAL MEDICINE

## 2024-09-11 PROCEDURE — 93005 ELECTROCARDIOGRAM TRACING: CPT

## 2024-09-11 PROCEDURE — 36415 COLL VENOUS BLD VENIPUNCTURE: CPT

## 2024-09-11 PROCEDURE — 85025 COMPLETE CBC W/AUTO DIFF WBC: CPT

## 2024-09-11 PROCEDURE — 81003 URINALYSIS AUTO W/O SCOPE: CPT | Performed by: EMERGENCY MEDICINE

## 2024-09-11 PROCEDURE — 71046 X-RAY EXAM CHEST 2 VIEWS: CPT

## 2024-09-11 PROCEDURE — 84484 ASSAY OF TROPONIN QUANT: CPT | Performed by: EMERGENCY MEDICINE

## 2024-09-11 PROCEDURE — 96361 HYDRATE IV INFUSION ADD-ON: CPT

## 2024-09-11 PROCEDURE — 84443 ASSAY THYROID STIM HORMONE: CPT

## 2024-09-11 PROCEDURE — 99285 EMERGENCY DEPT VISIT HI MDM: CPT | Performed by: EMERGENCY MEDICINE

## 2024-09-11 PROCEDURE — 80048 BASIC METABOLIC PNL TOTAL CA: CPT

## 2024-09-11 PROCEDURE — 96374 THER/PROPH/DIAG INJ IV PUSH: CPT

## 2024-09-11 PROCEDURE — 99284 EMERGENCY DEPT VISIT MOD MDM: CPT

## 2024-09-11 RX ORDER — ONDANSETRON 2 MG/ML
4 INJECTION INTRAMUSCULAR; INTRAVENOUS ONCE
Status: COMPLETED | OUTPATIENT
Start: 2024-09-11 | End: 2024-09-11

## 2024-09-11 RX ADMIN — ONDANSETRON 4 MG: 2 INJECTION INTRAMUSCULAR; INTRAVENOUS at 17:11

## 2024-09-11 RX ADMIN — SODIUM CHLORIDE 1000 ML: 0.9 INJECTION, SOLUTION INTRAVENOUS at 17:11

## 2024-09-11 NOTE — ED PROVIDER NOTES
1. Lightheadedness    2. Near syncope      ED Disposition       ED Disposition   Discharge    Condition   Stable    Date/Time   Wed Sep 11, 2024  9:08 PM    Comment   Perla Durán discharge to home/self care.                   Assessment & Plan       Medical Decision Making  Patient presents for evaluation of lightheadedness.  Differential includes cardiac arrhythmia, ischemia, anemia, thyroid, ACS.  Will order EKG, CBC, BMP, troponin, TSH, UA, CXR.  Will give fluids.    EKG is nonischemic.  Labs are unremarkable.  Ketones are present in the urine. She feels better after fluids. She has been able to ambulate while she has been here.  Patient's symptoms likely in setting of dehydration. She was told to follow-up with PCP. She was given return precautions and discharged in stable condition.     Amount and/or Complexity of Data Reviewed  Labs: ordered. Decision-making details documented in ED Course.  Radiology: ordered.    Risk  Prescription drug management.                  ED Course as of 09/15/24 0906   Wed Sep 11, 2024   1730 Hemoglobin: 12.6   1846 hs TnI 0hr: 8   2052 Leukocytes, UA: Negative   2052 Nitrite, UA: Negative   2052 Ketones, UA(!): 40 (2+)   2052 Delta 2hr hsTnI: 1       Medications   ondansetron (ZOFRAN) injection 4 mg (4 mg Intravenous Given 9/11/24 1711)   sodium chloride 0.9 % bolus 1,000 mL (0 mL Intravenous Stopped 9/11/24 2019)       History of Present Illness       Patient is a 44 year old female with history of anemia, gastric sleeve, HTN who presents with lightheadedness. Patient reports symptoms began about 4 hours ago while sitting at her computer at work. She ate a spoonful of sugar because thought her glucose was low but that made her feel nauseous. She did not vomit. She feels like symptoms have improved but she still feels mildly lightheaded. She has been able to ambulate normally. She denies head trauma. She did not take her BP medication this morning. She denies chest pain and  shortness of breath.             Review of Systems   Constitutional:  Negative for chills and fever.   HENT:  Negative for congestion and sore throat.    Respiratory:  Negative for cough and shortness of breath.    Cardiovascular:  Negative for chest pain and palpitations.   Gastrointestinal:  Negative for abdominal pain and vomiting.   Genitourinary:  Negative for dysuria and hematuria.   Musculoskeletal:  Negative for back pain and neck pain.   Neurological:  Positive for light-headedness. Negative for syncope and headaches.   All other systems reviewed and are negative.          Objective     ED Triage Vitals   Temperature Pulse Blood Pressure Respirations SpO2 Patient Position - Orthostatic VS   09/11/24 1408 09/11/24 1408 09/11/24 1408 09/11/24 1408 09/11/24 1408 09/11/24 2020   98 °F (36.7 °C) 68 152/99 18 100 % Sitting      Temp src Heart Rate Source BP Location FiO2 (%) Pain Score    -- 09/11/24 2020 09/11/24 2020 -- --     Monitor Left arm          Physical Exam  Vitals and nursing note reviewed.   HENT:      Head: Normocephalic and atraumatic.      Nose: No congestion or rhinorrhea.      Mouth/Throat:      Mouth: Mucous membranes are moist.   Eyes:      General:         Right eye: No discharge.         Left eye: No discharge.      Extraocular Movements: Extraocular movements intact.      Pupils: Pupils are equal, round, and reactive to light.   Cardiovascular:      Rate and Rhythm: Normal rate and regular rhythm.   Pulmonary:      Effort: Pulmonary effort is normal. No respiratory distress.      Breath sounds: Normal breath sounds. No wheezing or rhonchi.   Abdominal:      Palpations: Abdomen is soft.      Tenderness: There is no abdominal tenderness.   Musculoskeletal:      Cervical back: Normal range of motion.      Right lower leg: No edema.      Left lower leg: No edema.   Skin:     General: Skin is warm and dry.      Capillary Refill: Capillary refill takes less than 2 seconds.   Neurological:       Mental Status: She is alert.      Sensory: No sensory deficit.      Motor: No weakness.      Coordination: Coordination normal.      Gait: Gait normal.   Psychiatric:         Mood and Affect: Mood normal.         Labs Reviewed   BASIC METABOLIC PANEL - Abnormal       Result Value    Sodium 138      Potassium 3.5      Chloride 107      CO2 25      ANION GAP 6      BUN 7      Creatinine 0.78      Glucose 92      Calcium 8.0 (*)     eGFR 93      Narrative:     [employer] guidelines for Chronic Kidney Disease (CKD):     Stage 1 with normal or high GFR (GFR > 90 mL/min/1.73 square meters)    Stage 2 Mild CKD (GFR = 60-89 mL/min/1.73 square meters)    Stage 3A Moderate CKD (GFR = 45-59 mL/min/1.73 square meters)    Stage 3B Moderate CKD (GFR = 30-44 mL/min/1.73 square meters)    Stage 4 Severe CKD (GFR = 15-29 mL/min/1.73 square meters)    Stage 5 End Stage CKD (GFR <15 mL/min/1.73 square meters)  Note: GFR calculation is accurate only with a steady state creatinine   UA W REFLEX TO MICROSCOPIC WITH REFLEX TO CULTURE - Abnormal    Color, UA Light Yellow      Clarity, UA Clear      Specific Gravity, UA 1.011      pH, UA 6.5      Leukocytes, UA Negative      Nitrite, UA Negative      Protein, UA Negative      Glucose, UA Negative      Ketones, UA 40 (2+) (*)     Urobilinogen, UA <2.0      Bilirubin, UA Negative      Occult Blood, UA Negative     TSH, 3RD GENERATION WITH FREE T4 REFLEX - Normal    TSH 3RD GENERATON 1.138     HS TROPONIN I 0HR - Normal    hs TnI 0hr 8     HS TROPONIN I 2HR - Normal    hs TnI 2hr 9      Delta 2hr hsTnI 1     CBC AND DIFFERENTIAL    WBC 5.29      RBC 4.11      Hemoglobin 12.6      Hematocrit 39.6      MCV 96      MCH 30.7      MCHC 31.8      RDW 11.7      MPV 9.2      Platelets 334      nRBC 0      Segmented % 55      Immature Grans % 0      Lymphocytes % 34      Monocytes % 8      Eosinophils Relative 2      Basophils Relative 1      Absolute Neutrophils 2.91       Absolute Immature Grans 0.01      Absolute Lymphocytes 1.82      Absolute Monocytes 0.41      Eosinophils Absolute 0.09      Basophils Absolute 0.05       XR chest 2 views   Final Interpretation by Zachariah Sutton MD (09/12 0814)      No acute cardiopulmonary disease.            Workstation performed: [id number]             ECG 12 Lead Documentation Only    Date/Time: 9/4/2024 5:15 PM    Performed by: Silvia Briggs MD  Authorized by: Silvia Briggs MD    Patient location:  ED  Previous ECG:     Previous ECG:  Compared to current    Similarity:  No change  Interpretation:     Interpretation: abnormal    Rate:     ECG rate:  54    ECG rate assessment: bradycardic    Rhythm:     Rhythm: sinus bradycardia    Ectopy:     Ectopy: none    QRS:     QRS axis:  Normal    QRS intervals:  Normal  Conduction:     Conduction: normal    ST segments:     ST segments:  Normal  T waves:     T waves: normal             Silvia Briggs MD  09/15/24 0906

## 2024-09-11 NOTE — ED ATTENDING ATTESTATION
9/11/2024  I, Lea Pemberton MD, saw and evaluated the patient. I have discussed the patient with the resident/non-physician practitioner and agree with the resident's/non-physician practitioner's findings, Plan of Care, and MDM as documented in the resident's/non-physician practitioner's note, except where noted. All available labs and Radiology studies were reviewed.  I was present for key portions of any procedure(s) performed by the resident/non-physician practitioner and I was immediately available to provide assistance.       At this point I agree with the current assessment done in the Emergency Department.  I have conducted an independent evaluation of this patient a history and physical is as follows:    OA: 45 y/o f with h/o HTN, gastric bypass and subsequent anemia who presents with lightheadedness.  Patient states that she was sitting at her desk earlier today when she began to feel lightheaded.  She has had similar symptoms previously called her sister who stated that she should probably have something to eat because her blood sugar could be low.  She did eat and subsequently began to feel nauseous prompting her to use the bathroom for a bowel movement.  Whens he stood from the toilet, she complained of a feeling that she would pass out, that resolved as she stood for a moment  She went to lay down, felt improved but then noted some lightheadedness again and became anxious regarding her sxms so decided to come for evaluation. Does admit that she has not been drinking enough fluids and thought she could also be dehydrated. Denies room spinning dizziness.   Does not feel like she is leaning to one side over the other.  She has had more severe sxms previously, had workup and negative at that time. Also notes that this will intermittently happen to her but today lasted longer making her concerned.  Mildly nauseous, which has improved.  No vomiting.  Has been tolerating p.o.  Normal bowel movements.  No  urinary symptoms.  No fevers no chills.  No abdominal or back pain.  No focal numbness weakness or tingling.  No visual changes.  No headaches.  No chest pain no shortness of breath.  No palpitations.  On exam patient is anxious/tearful appearing but nontoxic.  HEENT is normocephalic and atraumatic.  Moist mucous membranes clear sclera and conjunctive.  Pupils equal round reactive.  Neck is supple full range of motion.  Heart is regular rate without murmurs.  Lungs are clear no wheezing rhonchi or rales.  Abdomen soft positive bowel sounds nontender.  No lower extremity edema no calf tenderness palpation.  Intact distal pulses.  Symmetric face and clear speech.  5 out of 5 strength and intact sensation x 4.  No drift.  Intact alternating movements.  Intact heel shin.  Intact finger-to-nose.  No nystagmus.  Intact gait.  Awake alert oriented and appropriate.  Assessment and plan 41-year-old female with episode of lightheadedness with nausea and anxious feelings.  She states that she has had similar episodes previously for which she has had evaluation at [facility]. Notes that her anxiety exacerbated her sxms, feeling improved now but still anxious.   Evaluate with basic blood work, EKG monitor on telemetry.  Chest x-ray, urinalysis. Re-evaluate, monitor and treat accordingly. Dispo and further workup pending re-evaluation.     S/p hysterectomy.   ED Course     Has had similar sxms previously of lightheadedness, treated at [facility]    Pt states she feels well, sxms all resolved after receiving IVF hydration. No complaints. Ambulating without difficulty. Tolerating PO.  Colntinues to deny any dizziness. Notes her anxiety has improved as well since being here and is now anxious for dc.     Critical Care Time  Procedures

## 2024-09-12 LAB
ATRIAL RATE: 64 BPM
P AXIS: 71 DEGREES
PR INTERVAL: 152 MS
QRS AXIS: 72 DEGREES
QRSD INTERVAL: 84 MS
QT INTERVAL: 408 MS
QTC INTERVAL: 420 MS
T WAVE AXIS: 46 DEGREES
VENTRICULAR RATE: 64 BPM

## 2024-09-12 PROCEDURE — 93010 ELECTROCARDIOGRAM REPORT: CPT | Performed by: STUDENT IN AN ORGANIZED HEALTH CARE EDUCATION/TRAINING PROGRAM

## 2024-09-12 NOTE — DISCHARGE INSTRUCTIONS
You were seen in the Emergency Department today for lightheadedness.    Please follow up with your primary care doctor this week. Please follow-up with Cardiology.  Please return to the Emergency Department if you experience worsening of your current symptoms, chest pain, shortness of breath, or any other concerning symptoms.

## 2025-08-09 ENCOUNTER — HOSPITAL ENCOUNTER (EMERGENCY)
Facility: HOSPITAL | Age: 45
Discharge: HOME | End: 2025-08-09
Attending: EMERGENCY MEDICINE | Admitting: EMERGENCY MEDICINE
Payer: COMMERCIAL

## (undated) DEVICE — INTENDED FOR TISSUE SEPARATION, AND OTHER PROCEDURES THAT REQUIRE A SHARP SURGICAL BLADE TO PUNCTURE OR CUT.: Brand: BARD-PARKER SAFETY BLADES SIZE 10, STERILE

## (undated) DEVICE — SCD SEQUENTIAL COMPRESSION COMFORT SLEEVE MEDIUM KNEE LENGTH: Brand: KENDALL SCD

## (undated) DEVICE — 3000CC GUARDIAN II: Brand: GUARDIAN

## (undated) DEVICE — HYDROPHILIC WOUND DRESSING WITH ZINC PLUS VITAMINS A AND B6.: Brand: DERMAGRAN®-B

## (undated) DEVICE — CHLORHEXIDINE 4PCT 4 OZ

## (undated) DEVICE — BETHLEHEM UNIVERSAL LAPAROTOMY: Brand: CARDINAL HEALTH

## (undated) DEVICE — GLOVE PI ULTRA TOUCH SZ.6.5

## (undated) DEVICE — SURGICEL 4 X 8

## (undated) DEVICE — SUT VICRYL 2-0 REEL 54 IN J286G

## (undated) DEVICE — WOUND RETRACTOR AND PROTECTOR: Brand: ALEXIS O WOUND PROTECTOR-RETRACTOR

## (undated) DEVICE — ABDOMINAL PAD: Brand: DERMACEA

## (undated) DEVICE — GLOVE INDICATOR PI UNDERGLOVE SZ 6.5 BLUE

## (undated) DEVICE — TELFA NON-ADHERENT ABSORBENT DRESSING: Brand: TELFA

## (undated) DEVICE — TRAY FOLEY 16FR URIMETER SILICONE SURESTEP

## (undated) DEVICE — CHLORAPREP HI-LITE 10.5ML ORANGE

## (undated) DEVICE — SUT VICRYL 0 CT-1 CR/8 27 IN JJ41G

## (undated) DEVICE — SUT VICRYL 0 CT-1 27 IN J260H

## (undated) DEVICE — ENSEAL 20 CM SHAFT, LARGE JAW: Brand: ENSEAL X1

## (undated) DEVICE — GLOVE PI ULTRA TOUCH SZ.7.0

## (undated) DEVICE — PREMIUM DRY TRAY LF: Brand: MEDLINE INDUSTRIES, INC.